# Patient Record
Sex: MALE | Race: WHITE | NOT HISPANIC OR LATINO | Employment: FULL TIME | URBAN - METROPOLITAN AREA
[De-identification: names, ages, dates, MRNs, and addresses within clinical notes are randomized per-mention and may not be internally consistent; named-entity substitution may affect disease eponyms.]

---

## 2017-01-16 ENCOUNTER — OFFICE VISIT (OUTPATIENT)
Dept: NEUROLOGY | Facility: CLINIC | Age: 32
End: 2017-01-16
Payer: COMMERCIAL

## 2017-01-16 VITALS
WEIGHT: 241.19 LBS | SYSTOLIC BLOOD PRESSURE: 122 MMHG | HEIGHT: 71 IN | BODY MASS INDEX: 33.77 KG/M2 | HEART RATE: 91 BPM | DIASTOLIC BLOOD PRESSURE: 72 MMHG

## 2017-01-16 DIAGNOSIS — G40.409 GRAND MAL EPILEPSY, CONTROLLED: Primary | ICD-10-CM

## 2017-01-16 DIAGNOSIS — F41.9 ANXIETY DISORDER, UNSPECIFIED TYPE: ICD-10-CM

## 2017-01-16 PROCEDURE — 99999 PR PBB SHADOW E&M-EST. PATIENT-LVL III: CPT | Mod: PBBFAC,,, | Performed by: PSYCHIATRY & NEUROLOGY

## 2017-01-16 PROCEDURE — 1159F MED LIST DOCD IN RCRD: CPT | Mod: S$GLB,,, | Performed by: PSYCHIATRY & NEUROLOGY

## 2017-01-16 PROCEDURE — 99204 OFFICE O/P NEW MOD 45 MIN: CPT | Mod: S$GLB,,, | Performed by: PSYCHIATRY & NEUROLOGY

## 2017-01-16 RX ORDER — CLONAZEPAM 0.5 MG/1
1 TABLET ORAL
COMMUNITY
Start: 2013-02-08 | End: 2017-01-16 | Stop reason: SDUPTHER

## 2017-01-16 RX ORDER — LEVETIRACETAM 250 MG/1
750 TABLET ORAL
COMMUNITY
Start: 2013-02-02 | End: 2017-04-21

## 2017-01-16 RX ORDER — CLONAZEPAM 0.5 MG/1
0.5 TABLET ORAL 2 TIMES DAILY
Qty: 60 TABLET | Refills: 3 | Status: SHIPPED | OUTPATIENT
Start: 2017-01-16 | End: 2017-04-21 | Stop reason: SDUPTHER

## 2017-01-16 RX ORDER — LEVETIRACETAM 500 MG/1
1000 TABLET, FILM COATED ORAL 2 TIMES DAILY
Qty: 120 TABLET | Refills: 11 | Status: SHIPPED | OUTPATIENT
Start: 2017-01-16 | End: 2017-04-21 | Stop reason: SDUPTHER

## 2017-01-16 RX ORDER — LAMOTRIGINE 100 MG/1
TABLET, ORALLY DISINTEGRATING ORAL
Qty: 150 TABLET | Refills: 6 | Status: SHIPPED | OUTPATIENT
Start: 2017-01-16 | End: 2017-04-21 | Stop reason: SDUPTHER

## 2017-01-16 RX ORDER — LAMOTRIGINE 100 MG/1
TABLET, ORALLY DISINTEGRATING ORAL
COMMUNITY
Start: 2013-02-02 | End: 2017-01-16 | Stop reason: SDUPTHER

## 2017-01-16 NOTE — PROGRESS NOTES
Consult Requested By: No att. providers found  Reason for Consult: Second opinion on epilepsy    SUBJECTIVE:       HPI:   HISTORY OF PRESENT ILLNESS:  This is a 31-year-old right-handed pleasant   gentleman, presented today in the clinic with the complaint of uncontrolled   seizures and second opinion on his epilepsy occurrence.  He said that he had   viral encephalitis at age 1 and started having seizure at age 10.  At age 12, he   started having grand mal seizure and at that time [place] [facility] [place], he has been evaluated intracranially in case he can have epileptic   surgery, but it has not been done because of potential loss of vision.  So,   since then, he was on medications for last 3 to 7 years.  He is on Keppra and   Lamictal in different tapering dose.  He was with Dr. Gale, [facility], now is planning to come to [facility].  He said that lately he has some   breakthrough spells, which he describes sudden onset in attention and loss of   focus during conversation or activity.  It is not often, sometimes it is daily,   sometimes in a week, sometimes once in a month.  He is  to his wife for   about five years.  Wife also mentioned that there is no grand mal seizure in   about 4 to 5 years, but he has this spell when he lost connections, but it is   for a couple of seconds to minute; after that he is totally normal.      Past Medical History   Diagnosis Date    Seizures      History reviewed. No pertinent past surgical history.  History reviewed. No pertinent family history.  Social History   Substance Use Topics    Smoking status: Current Every Day Smoker    Smokeless tobacco: None    Alcohol use Yes     Review of patient's allergies indicates:  No Known Allergies    Review of Systems   Constitutional: Negative for fever and weight loss.   HENT: Negative for ear pain, hearing loss and tinnitus.    Eyes: Negative for blurred vision, double vision, photophobia, pain,  discharge and redness.   Respiratory: Negative for cough and shortness of breath.    Cardiovascular: Negative for chest pain, palpitations, claudication and leg swelling.   Gastrointestinal: Negative for abdominal pain, heartburn, nausea and vomiting.   Genitourinary: Negative for dysuria, flank pain, frequency and urgency.   Musculoskeletal: Negative for back pain, falls, joint pain, myalgias and neck pain.   Skin: Negative for itching and rash.   Neurological: Positive for seizures. Negative for dizziness, tingling, tremors, sensory change, speech change, focal weakness, loss of consciousness, weakness and headaches.   Endo/Heme/Allergies: Does not bruise/bleed easily.   Psychiatric/Behavioral: Negative for depression, hallucinations, memory loss and suicidal ideas. The patient is nervous/anxious. The patient does not have insomnia.          OBJECTIVE:     Vital Signs (Most Recent)  Pulse: 91 (01/16/17 0809)  BP: 122/72 (01/16/17 0809)    Physical Exam   Constitutional: He is oriented to person, place, and time. He appears well-developed and well-nourished. No distress.   HENT:   Head: Normocephalic.   Right Ear: External ear normal.   Left Ear: External ear normal.   Mouth/Throat: Oropharynx is clear and moist.   Eyes: Conjunctivae and EOM are normal. Pupils are equal, round, and reactive to light.   Neck: Normal range of motion. Neck supple. No tracheal deviation present. No thyromegaly present.   Cardiovascular: Regular rhythm, normal heart sounds and intact distal pulses.    Pulmonary/Chest: Effort normal and breath sounds normal. No respiratory distress.   Abdominal: Soft. Bowel sounds are normal. There is no tenderness.   Musculoskeletal: He exhibits no edema or tenderness.   Lymphadenopathy:     He has no cervical adenopathy.   Neurological: He is alert and oriented to person, place, and time. He has normal strength and normal reflexes. He displays no tremor and normal reflexes. No cranial nerve deficit or  sensory deficit. He exhibits normal muscle tone. Coordination normal. He displays no Babinski's sign on the right side. He displays no Babinski's sign on the left side.   Reflex Scores:       Tricep reflexes are 2+ on the right side and 2+ on the left side.       Bicep reflexes are 2+ on the right side and 2+ on the left side.       Brachioradialis reflexes are 2+ on the right side and 2+ on the left side.       Patellar reflexes are 2+ on the right side and 2+ on the left side.       Achilles reflexes are 2+ on the right side and 2+ on the left side.  Skin: Skin is warm and dry. No rash noted. He is not diaphoretic. No erythema. No pallor.   Psychiatric: He has a normal mood and affect. His behavior is normal. Judgment and thought content normal.       Strength  Deltoids Triceps Biceps Wrist Extension Wrist Flexion Hand    Upper: R 5/5 5/5 5/5 5/5 5/5 5/5    L 5/5 5/5 5/5 5/5 5/5 5/5     Iliopsoas Quadriceps Knee  Flexion Tibialis  anterior Gastro- cnemius EHL   Lower: R 5/5 5/5 5/5 5/5 5/5 5/5    L 5/5 5/5 5/5 5/5 5/5 5/5         ASSESSMENT/PLAN:     Primary Diagnoses:  Diagnosed epilepsy for long time but having breakthrough mini spell  Despite medication.     Plan:  Will continue keppra 1000 mg bid  Lamectal 300 + 200 daily.  Klonopin 0.5 mg bid.  Talked about cutting down Klonopin in future.  Will see the records of spells in a month.  Reevaluate with MRI of the brain, EMU  And lab works.  Driving issue and state law was discussed.  Will see next month.

## 2017-01-16 NOTE — MR AVS SNAPSHOT
[facility]  [address]  [place] [address]  Phone: [phone]  Fax: [phone]                  Steven Ayala Jr.   2017 8:00 AM   Office Visit    Description:  Male : 1985   Provider:  Darrian Copeland MD   Department:  [facility]           Reason for Visit     epilepsy                To Do List           Future Appointments        Provider Department Dept Phone    2017 11:20 AM Darrian Copeland MD [facility] [phone]      Goals (5 Years of Data)     None       These Medications        Disp Refills Start End    KEPPRA 500 mg Tab 120 tablet 11 2017    Take 2 tablets (1,000 mg total) by mouth 2 (two) times daily. - Oral    clonazePAM (KLONOPIN) 0.5 MG tablet 60 tablet 3 2017     Take 1 tablet (0.5 mg total) by mouth 2 (two) times daily. - Oral    lamotrigine (LAMICTAL ODT) 100 mg TbDL 150 tablet 6 2017     300mg in the am and 200mg at night      [facility] On Call     [facility] On Call Nurse Care Line -  Assistance  Registered nurses in the [facility] provide clinical advisement, health education, appointment booking, and other advisory services.  Call for this free service at [phone].             Medications           Message regarding Medications     Verify the changes and/or additions to your medication regime listed below are the same as discussed with your clinician today.  If any of these changes or additions are incorrect, please notify your healthcare provider.        START taking these NEW medications        Refills    KEPPRA 500 mg Tab 11    Sig: Take 2 tablets (1,000 mg total) by mouth 2 (two) times daily.    Class: Print    Route: Oral    clonazePAM (KLONOPIN) 0.5 MG tablet 3    Sig: Take 1 tablet (0.5 mg total) by mouth 2 (two) times daily.    Class: Print    Route: Oral    lamotrigine (LAMICTAL ODT) 100 mg TbDL 6    Simg in the am and 200mg at night    Class: Print          "  Verify that the below list of medications is an accurate representation of the medications you are currently taking.  If none reported, the list may be blank. If incorrect, please contact your healthcare provider. Carry this list with you in case of emergency.           Current Medications     clonazePAM (KLONOPIN) 0.5 MG tablet Take 1 tablet (0.5 mg total) by mouth 2 (two) times daily.    lamotrigine (LAMICTAL ODT) 100 mg TbDL 300mg in the am and 200mg at night    levetiracetam (KEPPRA) 250 MG Tab 750 mg.    KEPPRA 500 mg Tab Take 2 tablets (1,000 mg total) by mouth 2 (two) times daily.           Clinical Reference Information           Vital Signs - Last Recorded  Most recent update: 1/16/2017  8:09 AM by Dmitry Bernal LPN    BP Pulse Ht Wt BMI    122/72 91 5' 11" (1.803 m) 109.4 kg (241 lb 2.9 oz) 33.64 kg/m2      Blood Pressure          Most Recent Value    BP  122/72      Allergies as of 1/16/2017     No Known Allergies      Immunizations Administered on Date of Encounter - 1/16/2017     None      [facility] Sign-Up     Activating your [facility] account is as easy as 1-2-3!     1) Visit [email or web address], select Sign Up Now, enter this activation code and your date of birth, then select Next.  [email or web address]  Expires: 1/20/2017  8:21 AM      2) Create a username and password to use when you visit [facility] in the future and select a security question in case you lose your password and select Next.    3) Enter your e-mail address and click Sign Up!    Additional Information  If you have questions, please e-mail [email or web address] or call [phone] to talk to our [facility] staff. Remember, [facility] is NOT to be used for urgent needs. For medical emergencies, dial 911.         Smoking Cessation     If you would like to quit smoking:   You may be eligible for free services if you are a [place] resident and started smoking cigarettes before September 1, 1988.  Call the Smoking Cessation Trust (SCT) " toll free at (848) 358-1273 or [phone] [phone].   Call 5-222-QUIT-NOW if you do not meet the above criteria.

## 2017-04-21 ENCOUNTER — OFFICE VISIT (OUTPATIENT)
Dept: NEUROLOGY | Facility: CLINIC | Age: 32
End: 2017-04-21
Payer: COMMERCIAL

## 2017-04-21 VITALS
DIASTOLIC BLOOD PRESSURE: 72 MMHG | SYSTOLIC BLOOD PRESSURE: 130 MMHG | BODY MASS INDEX: 33.86 KG/M2 | HEART RATE: 96 BPM | HEIGHT: 71 IN | WEIGHT: 241.88 LBS

## 2017-04-21 DIAGNOSIS — G40.909 RECURRENT SEIZURES: Primary | ICD-10-CM

## 2017-04-21 DIAGNOSIS — G40.409 GRAND MAL EPILEPSY, CONTROLLED: ICD-10-CM

## 2017-04-21 PROCEDURE — 99215 OFFICE O/P EST HI 40 MIN: CPT | Mod: S$GLB,,, | Performed by: PSYCHIATRY & NEUROLOGY

## 2017-04-21 PROCEDURE — 99999 PR PBB SHADOW E&M-EST. PATIENT-LVL III: CPT | Mod: PBBFAC,,, | Performed by: PSYCHIATRY & NEUROLOGY

## 2017-04-21 PROCEDURE — 1160F RVW MEDS BY RX/DR IN RCRD: CPT | Mod: S$GLB,,, | Performed by: PSYCHIATRY & NEUROLOGY

## 2017-04-21 RX ORDER — LAMOTRIGINE 25 MG/1
25 TABLET ORAL DAILY
Qty: 30 TABLET | Refills: 11 | Status: SHIPPED | OUTPATIENT
Start: 2017-04-21 | End: 2017-04-21

## 2017-04-21 RX ORDER — LAMOTRIGINE 100 MG/1
TABLET, ORALLY DISINTEGRATING ORAL
Qty: 150 TABLET | Refills: 6 | Status: SHIPPED | OUTPATIENT
Start: 2017-04-21 | End: 2017-04-21

## 2017-04-21 RX ORDER — LEVETIRACETAM 500 MG/1
1000 TABLET, FILM COATED ORAL 2 TIMES DAILY
Qty: 120 TABLET | Refills: 11 | Status: SHIPPED | OUTPATIENT
Start: 2017-04-21 | End: 2018-04-21

## 2017-04-21 RX ORDER — LAMOTRIGINE 100 MG/1
TABLET ORAL
Qty: 150 TABLET | Refills: 11 | Status: SHIPPED | OUTPATIENT
Start: 2017-04-21 | End: 2018-04-30 | Stop reason: SDUPTHER

## 2017-04-21 RX ORDER — CLONAZEPAM 0.5 MG/1
0.5 TABLET ORAL 2 TIMES DAILY
Qty: 60 TABLET | Refills: 3 | Status: SHIPPED | OUTPATIENT
Start: 2017-04-21 | End: 2018-04-04 | Stop reason: SDUPTHER

## 2017-04-21 NOTE — LETTER
2017    Steven Ayala Jr.  [address] [address]         '[facility]  [address] [place] [address] Drive  [facility] [address]  Phone: [phone]  Fax: [phone] 2017     Patient: Steven Ayala Jr.   YOB: 1985   Date of Visit: 2017       To Whom It May Concern:    It is my medical opinion that Steven Ayala may return to work on 2017 with restriction below.        SEIZURE PRECAUTION      [facility] Department of Neurology has discussed and alerted me to the danger of driving, after being diagnosed with a Seizure or possible Seizures Disorder.    The situation of driving and Seizure Disorder is very dangerous for me as the patient, as well as others on the road.  It was explained to me that seizure medication does not guarantee the full control of seizure episodes.  Seizures can occur at any time and anywhere and in any situation.    My Neurologist discussed with me Seizure Safety Precautions, and I was informed that patients with Seizure Disorders should avoid the followin. Unattended Swimming.  2. Working in the fireplace.  3. Climbing high ladders, steps, and trees.  4. Working with sharp cutting machines and tools, or any other potentially harmful activity.    I understand that the State [facility] does not require the doctor to report Seizure Disorders to the Department of Motor Vehicles.  However, I am aware that me, as a patient with a Seizure Disorder, am personally obligated to inform the doctor and the Motor Vehicle Department if a situation arises.      Signature of the Patient:_________________________    Signature of the Doctor:       Signature of the witness :          If you have any questions or concerns, please don't hesitate to call.    Sincerely,        Darrian Copeland MD

## 2017-04-21 NOTE — MR AVS SNAPSHOT
[facility]  [address]  [place] [address]  Phone: [phone]  Fax: [phone]                  Steven Ayala Jr.   2017 9:20 AM   Office Visit    Description:  Male : 1985   Provider:  Darrian Copeland MD   Department:  [facility]           Reason for Visit     Seizures     needs release to work           Diagnoses this Visit        Comments    Recurrent seizures    -  Primary            To Do List           Future Appointments        Provider Department Dept Phone    2017 11:50 AM LABORATORY, [address] [facility] [phone]    2017 8:45 AM [facility] MRI [facility] [phone]    2017 11:20 AM Darrian Copeland MD [facility] [phone]      Goals (5 Years of Data)     None       These Medications        Disp Refills Start End    KEPPRA 500 mg Tab 120 tablet 11 2017    Take 2 tablets (1,000 mg total) by mouth 2 (two) times daily. - Oral    Pharmacy: [place] [address] Ph #: [phone]       lamotrigine (LAMICTAL ODT) 100 mg TbDL 150 tablet 6 2017     300mg in the am and 200mg at night    Pharmacy: [place] [address] Ph #: [phone]       clonazePAM (KLONOPIN) 0.5 MG tablet 60 tablet 3 2017     Take 1 tablet (0.5 mg total) by mouth 2 (two) times daily. - Oral    Pharmacy: [facility] [address] Ph #: [phone]       LAMICTAL 100 mg tablet 150 tablet 11 2017     300 mg in am and 200 in pm    Pharmacy: [place] [address] Ph #: [phone]         [facility]     [facility] Nurse Care Line -  Assistance  Unless otherwise directed by your provider, please contact [facility], our nurse care line that is available for  assistance.     Registered nurses in the [facility] provide: appointment scheduling,  "clinical advisement, health education, and other advisory services.  Call: [phone] (toll free)               Medications           Message regarding Medications     Verify the changes and/or additions to your medication regime listed below are the same as discussed with your clinician today.  If any of these changes or additions are incorrect, please notify your healthcare provider.        START taking these NEW medications        Refills    LAMICTAL 100 mg tablet 11    Si mg in am and 200 in pm    Class: Normal           Verify that the below list of medications is an accurate representation of the medications you are currently taking.  If none reported, the list may be blank. If incorrect, please contact your healthcare provider. Carry this list with you in case of emergency.           Current Medications     clonazePAM (KLONOPIN) 0.5 MG tablet Take 1 tablet (0.5 mg total) by mouth 2 (two) times daily.    KEPPRA 500 mg Tab Take 2 tablets (1,000 mg total) by mouth 2 (two) times daily.    lamotrigine (LAMICTAL ODT) 100 mg TbDL 300mg in the am and 200mg at night    LAMICTAL 100 mg tablet 300 mg in am and 200 in pm           Clinical Reference Information           Your Vitals Were     BP Pulse Height Weight BMI    130/72 96 5' 11" (1.803 m) 109.7 kg (241 lb 13.5 oz) 33.73 kg/m2      Blood Pressure          Most Recent Value    BP  130/72      Allergies as of 2017     No Known Allergies      Immunizations Administered on Date of Encounter - 2017     None      Orders Placed During Today's Visit     Future Labs/Procedures Expected by Expires    Levetiracetam level  2017    MRI Brain W WO Contrast  2017      [facility] Sign-Up     Activating your [facility] account is as easy as 1-2-3!     1) Visit [email or web address], select Sign Up Now, enter this activation code and your date of birth, then select Next.  [email or web address]  Expires: 2017 10:26 AM      2) Create a " username and password to use when you visit [facility] in the future and select a security question in case you lose your password and select Next.    3) Enter your e-mail address and click Sign Up!    Additional Information  If you have questions, please e-mail [email or web address] or call [phone] to talk to our [facility] staff. Remember, [facility] is NOT to be used for urgent needs. For medical emergencies, dial 911.         Smoking Cessation     If you would like to quit smoking:   You may be eligible for free services if you are a [place] resident and started smoking cigarettes before September 1, 1988.  Call the Smoking Cessation Trust ([facility]) toll free at (022) 717-1915 or (726) 383-9698.   Call 9-834-QUIT-NOW if you do not meet the above criteria.   Contact us via email: [email or web address]   View our website for more information: [email or web address]        Language Assistance Services     ATTENTION: Language assistance services are available, free of charge. Please call [phone].      ATENCIÓN: Si habla español, tiene a huston disposición servicios gratuitos de asistencia lingüística. Llame al [phone].     CHÚ Ý: N?u b?n nói Ti?ng Vi?t, có các d?ch v? h? tr? ngôn ng? mi?n phí dành cho b?n. G?i s? [phone].         [facility] complies with applicable Federal civil rights laws and does not discriminate on the basis of race, color, national origin, age, disability, or sex.

## 2017-04-28 ENCOUNTER — LAB VISIT (OUTPATIENT)
Dept: LAB | Facility: HOSPITAL | Age: 32
End: 2017-04-28
Attending: INTERNAL MEDICINE
Payer: COMMERCIAL

## 2017-04-28 DIAGNOSIS — G40.909 RECURRENT SEIZURES: ICD-10-CM

## 2017-04-28 PROCEDURE — 80177 DRUG SCRN QUAN LEVETIRACETAM: CPT

## 2017-04-28 PROCEDURE — 36415 COLL VENOUS BLD VENIPUNCTURE: CPT

## 2017-05-01 LAB — LEVETIRACETAM SERPL-MCNC: 5.7 UG/ML (ref 3–60)

## 2017-05-04 ENCOUNTER — TELEPHONE (OUTPATIENT)
Dept: NEUROLOGY | Facility: CLINIC | Age: 32
End: 2017-05-04

## 2017-05-04 ENCOUNTER — TELEPHONE (OUTPATIENT)
Dept: RADIOLOGY | Facility: HOSPITAL | Age: 32
End: 2017-05-04

## 2017-05-04 NOTE — TELEPHONE ENCOUNTER
----- Message from Darrian Copeland MD sent at 5/1/2017  8:02 AM CDT -----  Result is normal

## 2017-05-19 ENCOUNTER — OFFICE VISIT (OUTPATIENT)
Dept: NEUROLOGY | Facility: CLINIC | Age: 32
End: 2017-05-19
Payer: COMMERCIAL

## 2017-05-19 VITALS
SYSTOLIC BLOOD PRESSURE: 132 MMHG | HEART RATE: 84 BPM | WEIGHT: 242.5 LBS | HEIGHT: 71 IN | DIASTOLIC BLOOD PRESSURE: 82 MMHG | BODY MASS INDEX: 33.95 KG/M2

## 2017-05-19 DIAGNOSIS — G40.409 GRAND MAL EPILEPSY, CONTROLLED: Primary | ICD-10-CM

## 2017-05-19 PROCEDURE — 99215 OFFICE O/P EST HI 40 MIN: CPT | Mod: S$GLB,,, | Performed by: PSYCHIATRY & NEUROLOGY

## 2017-05-19 PROCEDURE — 1160F RVW MEDS BY RX/DR IN RCRD: CPT | Mod: S$GLB,,, | Performed by: PSYCHIATRY & NEUROLOGY

## 2017-05-19 PROCEDURE — 99999 PR PBB SHADOW E&M-EST. PATIENT-LVL II: CPT | Mod: PBBFAC,,, | Performed by: PSYCHIATRY & NEUROLOGY

## 2017-05-19 NOTE — PROGRESS NOTES
Progress note  Neurology      Neurology follow up:  For: Epilepsy.    SUBJECTIVE:      HPI:   No seizure . He is here for paper works.    Past Medical History:   Diagnosis Date    Seizures      History reviewed. No pertinent surgical history.  History reviewed. No pertinent family history.  Social History   Substance Use Topics    Smoking status: Current Every Day Smoker    Smokeless tobacco: None    Alcohol use Yes       Review of patient's allergies indicates:  No Known Allergies   There is no problem list on file for this patient.        Review of Systems   Constitutional: Negative for fever and weight loss.   HENT: Negative for ear pain, hearing loss and tinnitus.    Eyes: Negative for blurred vision, double vision, photophobia, pain, discharge and redness.   Respiratory: Negative for cough and shortness of breath.    Cardiovascular: Negative for chest pain, palpitations, claudication and leg swelling.   Gastrointestinal: Negative for abdominal pain, heartburn, nausea and vomiting.   Genitourinary: Negative for dysuria, flank pain, frequency and urgency.   Musculoskeletal: Negative for back pain, falls, joint pain, myalgias and neck pain.   Skin: Negative for itching and rash.   Neurological: Positive for seizures. Negative for dizziness, tingling, tremors, sensory change, speech change, focal weakness, loss of consciousness, weakness and headaches.   Endo/Heme/Allergies: Does not bruise/bleed easily.   Psychiatric/Behavioral: Negative for depression, hallucinations, memory loss and suicidal ideas. The patient is nervous/anxious. The patient does not have insomnia.            OBJECTIVE:     Physical Exam   Constitutional: He is oriented to person, place, and time. He appears well-developed and well-nourished. No distress.   HENT:   Head: Normocephalic.   Right Ear: External ear normal.   Left Ear: External ear normal.   Mouth/Throat: Oropharynx is clear and moist.   Eyes: Conjunctivae and EOM are normal.  Pupils are equal, round, and reactive to light.   Neck: Normal range of motion. Neck supple. No tracheal deviation present. No thyromegaly present.   Cardiovascular: Regular rhythm, normal heart sounds and intact distal pulses.    Pulmonary/Chest: Effort normal and breath sounds normal. No respiratory distress.   Abdominal: Soft. Bowel sounds are normal. There is no tenderness.   Musculoskeletal: He exhibits no edema or tenderness.   Lymphadenopathy:     He has no cervical adenopathy.   Neurological: He is alert and oriented to person, place, and time. He has normal strength and normal reflexes. He displays no tremor and normal reflexes. No cranial nerve deficit or sensory deficit. He exhibits normal muscle tone. Coordination normal. He displays no Babinski's sign on the right side. He displays no Babinski's sign on the left side.   Reflex Scores:       Tricep reflexes are 2+ on the right side and 2+ on the left side.       Bicep reflexes are 2+ on the right side and 2+ on the left side.       Brachioradialis reflexes are 2+ on the right side and 2+ on the left side.       Patellar reflexes are 2+ on the right side and 2+ on the left side.       Achilles reflexes are 2+ on the right side and 2+ on the left side.  Skin: Skin is warm and dry. No rash noted. He is not diaphoretic. No erythema. No pallor.   Psychiatric: He has a normal mood and affect. His behavior is normal. Judgment and thought content normal.       Strength  Deltoids Triceps Biceps Wrist Extension Wrist Flexion Hand    Upper: R 5/5 5/5 5/5 5/5 5/5 5/5    L 5/5 5/5 5/5 5/5 5/5 5/5     Iliopsoas Quadriceps Knee  Flexion Tibialis  anterior Gastro- cnemius EHL   Lower: R 5/5 5/5 5/5 5/5 5/5 5/5    L 5/5 5/5 5/5 5/5 5/5 5/5               ASSESSMENT/PLAN:     Assessment:   1. Epilepsy on medication but still having break through seizure he did some noncompliance.    Plan:  1. Seizure precaution discussed. He signed the paper .  2. Medication refills  are done.  3. Advised to slowly taper off Klonopin     Will see in 3 months.

## 2017-05-19 NOTE — MR AVS SNAPSHOT
"    [facility]  [address]  [place] [address]  Phone: [phone]  Fax: [phone]                  Steven Ayala Jr.   2017 11:20 AM   Office Visit    Description:  Male : 1985   Provider:  Darrian Copeland MD   Department:  [facility]                To Do List           Future Appointments        Provider Department Dept Phone    2017 11:20 AM Darrian Copeland MD [facility] [phone]      Goals (5 Years of Data)     None      Follow-Up and Disposition     Return in about 3 months (around 2017).      [facility]     [facility] Nurse Care Line -  Assistance  Unless otherwise directed by your provider, please contact [facility], our nurse care line that is available for  assistance.     Registered nurses in the [facility] Center provide: appointment scheduling, clinical advisement, health education, and other advisory services.  Call: [phone] (toll free)               Medications           Message regarding Medications     Verify the changes and/or additions to your medication regime listed below are the same as discussed with your clinician today.  If any of these changes or additions are incorrect, please notify your healthcare provider.             Verify that the below list of medications is an accurate representation of the medications you are currently taking.  If none reported, the list may be blank. If incorrect, please contact your healthcare provider. Carry this list with you in case of emergency.           Current Medications     clonazePAM (KLONOPIN) 0.5 MG tablet Take 1 tablet (0.5 mg total) by mouth 2 (two) times daily.    KEPPRA 500 mg Tab Take 2 tablets (1,000 mg total) by mouth 2 (two) times daily.    LAMICTAL 100 mg tablet 300 mg in am and 200 in pm           Clinical Reference Information           Your Vitals Were     BP Pulse Height Weight BMI    132/82 84 5' 11" (1.803 m) 110 kg (242 lb " 8.1 oz) 33.82 kg/m2      Blood Pressure          Most Recent Value    BP  132/82      Allergies as of 5/19/2017     No Known Allergies      Immunizations Administered on Date of Encounter - 5/19/2017     None      [facility] Sign-Up     Activating your [facility] account is as easy as 1-2-3!     1) Visit [email or web address], select Sign Up Now, enter this activation code and your date of birth, then select Next.  [email or web address]  Expires: 6/5/2017 10:26 AM      2) Create a username and password to use when you visit [facility] in the future and select a security question in case you lose your password and select Next.    3) Enter your e-mail address and click Sign Up!    Additional Information  If you have questions, please e-mail [email or web address] or call [phone] to talk to our [facility] staff. Remember, [facility] is NOT to be used for urgent needs. For medical emergencies, dial 911.         Smoking Cessation     If you would like to quit smoking:   You may be eligible for free services if you are a [place] resident and started smoking cigarettes before September 1, 1988.  Call the Smoking Cessation Trust ([facility]) toll free at (235) 841-2835 or (667) 233-2778.   Call 1-800-QUIT-NOW if you do not meet the above criteria.   Contact us via email: [email or web address]   View our website for more information: [email or web address]        Language Assistance Services     ATTENTION: Language assistance services are available, free of charge. Please call [phone].      ATENCIÓN: Si habla español, tiene a huston disposición servicios gratuitos de asistencia lingüística. Llame al [phone].     CHÚ Ý: N?u b?n nói Ti?ng Vi?t, có các d?ch v? h? tr? ngôn ng? mi?n phí dành cho b?n. G?i s? [phone].         [facility] complies with applicable Federal civil rights laws and does not discriminate on the basis of race, color, national origin, age, disability, or sex.

## 2018-02-28 ENCOUNTER — TELEPHONE (OUTPATIENT)
Dept: NEUROLOGY | Facility: CLINIC | Age: 33
End: 2018-02-28

## 2018-02-28 NOTE — TELEPHONE ENCOUNTER
Called to inform pt that there wasn't any opens , but informed her that I could add him to the waiting list if anything came up pt verbalized understanding .Amy Nieves

## 2018-03-12 ENCOUNTER — OFFICE VISIT (OUTPATIENT)
Dept: NEUROLOGY | Facility: CLINIC | Age: 33
End: 2018-03-12
Payer: COMMERCIAL

## 2018-03-12 VITALS
BODY MASS INDEX: 31.26 KG/M2 | WEIGHT: 223.31 LBS | HEART RATE: 86 BPM | HEIGHT: 71 IN | DIASTOLIC BLOOD PRESSURE: 82 MMHG | SYSTOLIC BLOOD PRESSURE: 128 MMHG

## 2018-03-12 DIAGNOSIS — R56.9 SEIZURE: ICD-10-CM

## 2018-03-12 PROCEDURE — 99215 OFFICE O/P EST HI 40 MIN: CPT | Mod: S$GLB,,, | Performed by: PSYCHIATRY & NEUROLOGY

## 2018-03-12 PROCEDURE — 99999 PR PBB SHADOW E&M-EST. PATIENT-LVL III: CPT | Mod: PBBFAC,,, | Performed by: PSYCHIATRY & NEUROLOGY

## 2018-03-12 RX ORDER — LEVETIRACETAM 1000 MG/1
750 TABLET ORAL
COMMUNITY
Start: 2013-02-02 | End: 2018-04-30 | Stop reason: SDUPTHER

## 2018-03-12 NOTE — PROGRESS NOTES
Progress note  Neurology      Neurology follow up:  For: Epilepsy.    SUBJECTIVE:      HPI:   No seizure . He is here for paper works. He is telling that when he was very young , he was told that he has scar tissue in the brain. He did not have MRI of the brain done ordered last time. His last seizure was on 4/19/2017.    Past Medical History:   Diagnosis Date    Seizures      History reviewed. No pertinent surgical history.  History reviewed. No pertinent family history.  Social History   Substance Use Topics    Smoking status: Current Every Day Smoker    Smokeless tobacco: None    Alcohol use Yes       Review of patient's allergies indicates:  No Known Allergies   There is no problem list on file for this patient.        Review of Systems   Constitutional: Negative for fever and weight loss.   HENT: Negative for ear pain, hearing loss and tinnitus.    Eyes: Negative for blurred vision, double vision, photophobia, pain, discharge and redness.   Respiratory: Negative for cough and shortness of breath.    Cardiovascular: Negative for chest pain, palpitations, claudication and leg swelling.   Gastrointestinal: Negative for abdominal pain, heartburn, nausea and vomiting.   Genitourinary: Negative for dysuria, flank pain, frequency and urgency.   Musculoskeletal: Negative for back pain, falls, joint pain, myalgias and neck pain.   Skin: Negative for itching and rash.   Neurological: Positive for seizures. Negative for dizziness, tingling, tremors, sensory change, speech change, focal weakness, loss of consciousness, weakness and headaches.   Endo/Heme/Allergies: Does not bruise/bleed easily.   Psychiatric/Behavioral: Negative for depression, hallucinations, memory loss and suicidal ideas. The patient is nervous/anxious. The patient does not have insomnia.            OBJECTIVE:     Physical Exam   Constitutional: He is oriented to person, place, and time. He appears well-developed and well-nourished. No distress.    HENT:   Head: Normocephalic.   Right Ear: External ear normal.   Left Ear: External ear normal.   Mouth/Throat: Oropharynx is clear and moist.   Eyes: Conjunctivae and EOM are normal. Pupils are equal, round, and reactive to light.   Neck: Normal range of motion. Neck supple. No tracheal deviation present. No thyromegaly present.   Cardiovascular: Regular rhythm, normal heart sounds and intact distal pulses.    Pulmonary/Chest: Effort normal and breath sounds normal. No respiratory distress.   Abdominal: Soft. Bowel sounds are normal. There is no tenderness.   Musculoskeletal: He exhibits no edema or tenderness.   Lymphadenopathy:     He has no cervical adenopathy.   Neurological: He is alert and oriented to person, place, and time. He has normal strength and normal reflexes. He displays no tremor and normal reflexes. No cranial nerve deficit or sensory deficit. He exhibits normal muscle tone. Coordination normal. He displays no Babinski's sign on the right side. He displays no Babinski's sign on the left side.   Reflex Scores:       Tricep reflexes are 2+ on the right side and 2+ on the left side.       Bicep reflexes are 2+ on the right side and 2+ on the left side.       Brachioradialis reflexes are 2+ on the right side and 2+ on the left side.       Patellar reflexes are 2+ on the right side and 2+ on the left side.       Achilles reflexes are 2+ on the right side and 2+ on the left side.  Skin: Skin is warm and dry. No rash noted. He is not diaphoretic. No erythema. No pallor.   Psychiatric: He has a normal mood and affect. His behavior is normal. Judgment and thought content normal.       Strength  Deltoids Triceps Biceps Wrist Extension Wrist Flexion Hand    Upper: R 5/5 5/5 5/5 5/5 5/5 5/5    L 5/5 5/5 5/5 5/5 5/5 5/5     Iliopsoas Quadriceps Knee  Flexion Tibialis  anterior Gastro- cnemius EHL   Lower: R 5/5 5/5 5/5 5/5 5/5 5/5    L 5/5 5/5 5/5 5/5 5/5 5/5               ASSESSMENT/PLAN:      Assessment:   1. Epilepsy on medication but still having break through seizure he did some noncompliance.    Plan:  1. Seizure precaution discussed. He signed the paper .  2. Medication refills are done.  3. Advised to slowly taper off Klonopin   4. MRI of the brain ordered.    Will see in 3 months.

## 2018-03-19 ENCOUNTER — HOSPITAL ENCOUNTER (OUTPATIENT)
Dept: RADIOLOGY | Facility: HOSPITAL | Age: 33
Discharge: HOME | End: 2018-03-19
Attending: PSYCHIATRY & NEUROLOGY
Payer: COMMERCIAL

## 2018-03-19 DIAGNOSIS — R56.9 SEIZURE: ICD-10-CM

## 2018-03-19 PROCEDURE — 70553 MRI BRAIN STEM W/O & W/DYE: CPT | Mod: TC

## 2018-03-19 PROCEDURE — 25500020 PHARM REV CODE 255: Performed by: PSYCHIATRY & NEUROLOGY

## 2018-03-19 PROCEDURE — A9585 GADOBUTROL INJECTION: HCPCS | Performed by: PSYCHIATRY & NEUROLOGY

## 2018-03-19 RX ORDER — GADOBUTROL 604.72 MG/ML
10 INJECTION INTRAVENOUS
Status: COMPLETED | OUTPATIENT
Start: 2018-03-19 | End: 2018-03-19

## 2018-03-19 RX ADMIN — GADOBUTROL 10 ML: 604.72 INJECTION INTRAVENOUS at 05:03

## 2018-03-21 ENCOUNTER — TELEPHONE (OUTPATIENT)
Dept: NEUROLOGY | Facility: CLINIC | Age: 33
End: 2018-03-21

## 2018-03-21 NOTE — TELEPHONE ENCOUNTER
----- Message from Darrian Copeland MD sent at 3/20/2018  8:08 AM CDT -----  Your test result is abnormal but not serious. Will discuss on your return visit.

## 2018-03-21 NOTE — TELEPHONE ENCOUNTER
Called patient, wife states that he was not home. Scheduled patient for follow up to discuss MRI results with Dr. Copeland.

## 2018-04-04 RX ORDER — CLONAZEPAM 0.5 MG/1
0.5 TABLET ORAL 2 TIMES DAILY
Qty: 60 TABLET | Refills: 3 | Status: SHIPPED | OUTPATIENT
Start: 2018-04-04 | End: 2018-09-05 | Stop reason: SDUPTHER

## 2018-04-27 ENCOUNTER — OFFICE VISIT (OUTPATIENT)
Dept: NEUROLOGY | Facility: CLINIC | Age: 33
End: 2018-04-27
Payer: COMMERCIAL

## 2018-04-27 VITALS
DIASTOLIC BLOOD PRESSURE: 80 MMHG | WEIGHT: 220.44 LBS | BODY MASS INDEX: 30.86 KG/M2 | SYSTOLIC BLOOD PRESSURE: 110 MMHG | HEIGHT: 71 IN | RESPIRATION RATE: 20 BRPM | TEMPERATURE: 72 F

## 2018-04-27 DIAGNOSIS — G40.409 GRAND MAL EPILEPSY, CONTROLLED: Primary | ICD-10-CM

## 2018-04-27 PROCEDURE — 99215 OFFICE O/P EST HI 40 MIN: CPT | Mod: S$GLB,,, | Performed by: PSYCHIATRY & NEUROLOGY

## 2018-04-27 PROCEDURE — 99999 PR PBB SHADOW E&M-EST. PATIENT-LVL III: CPT | Mod: PBBFAC,,, | Performed by: PSYCHIATRY & NEUROLOGY

## 2018-04-27 NOTE — PROGRESS NOTES
Progress note  Neurology      Neurology follow up:  For: Epilepsy.    SUBJECTIVE:      HPI:   No seizure . He is here for paper works. He is telling that when he was very young , he was told that he has scar tissue in the brain. He is here to go over the MRI of the brain. He has some seizure like activities frequently despite he takes medication on time.    Past Medical History:   Diagnosis Date    Seizures      History reviewed. No pertinent surgical history.  History reviewed. No pertinent family history.  Social History   Substance Use Topics    Smoking status: Current Every Day Smoker    Smokeless tobacco: None    Alcohol use Yes       Review of patient's allergies indicates:  No Known Allergies   There is no problem list on file for this patient.        Review of Systems   Constitutional: Negative for fever and weight loss.   HENT: Negative for ear pain, hearing loss and tinnitus.    Eyes: Negative for blurred vision, double vision, photophobia, pain, discharge and redness.   Respiratory: Negative for cough and shortness of breath.    Cardiovascular: Negative for chest pain, palpitations, claudication and leg swelling.   Gastrointestinal: Negative for abdominal pain, heartburn, nausea and vomiting.   Genitourinary: Negative for dysuria, flank pain, frequency and urgency.   Musculoskeletal: Negative for back pain, falls, joint pain, myalgias and neck pain.   Skin: Negative for itching and rash.   Neurological: Positive for seizures. Negative for dizziness, tingling, tremors, sensory change, speech change, focal weakness, loss of consciousness, weakness and headaches.   Endo/Heme/Allergies: Does not bruise/bleed easily.   Psychiatric/Behavioral: Negative for depression, hallucinations, memory loss and suicidal ideas. The patient is nervous/anxious. The patient does not have insomnia.            OBJECTIVE:     Physical Exam   Constitutional: He is oriented to person, place, and time. He appears  well-developed and well-nourished. No distress.   HENT:   Head: Normocephalic.   Right Ear: External ear normal.   Left Ear: External ear normal.   Mouth/Throat: Oropharynx is clear and moist.   Eyes: Conjunctivae and EOM are normal. Pupils are equal, round, and reactive to light.   Neck: Normal range of motion. Neck supple. No tracheal deviation present. No thyromegaly present.   Cardiovascular: Regular rhythm, normal heart sounds and intact distal pulses.    Pulmonary/Chest: Effort normal and breath sounds normal. No respiratory distress.   Abdominal: Soft. Bowel sounds are normal. There is no tenderness.   Musculoskeletal: He exhibits no edema or tenderness.   Lymphadenopathy:     He has no cervical adenopathy.   Neurological: He is alert and oriented to person, place, and time. He has normal strength and normal reflexes. He displays no tremor and normal reflexes. No cranial nerve deficit or sensory deficit. He exhibits normal muscle tone. Coordination normal. He displays no Babinski's sign on the right side. He displays no Babinski's sign on the left side.   Reflex Scores:       Tricep reflexes are 2+ on the right side and 2+ on the left side.       Bicep reflexes are 2+ on the right side and 2+ on the left side.       Brachioradialis reflexes are 2+ on the right side and 2+ on the left side.       Patellar reflexes are 2+ on the right side and 2+ on the left side.       Achilles reflexes are 2+ on the right side and 2+ on the left side.  Skin: Skin is warm and dry. No rash noted. He is not diaphoretic. No erythema. No pallor.   Psychiatric: He has a normal mood and affect. His behavior is normal. Judgment and thought content normal.       Strength  Deltoids Triceps Biceps Wrist Extension Wrist Flexion Hand    Upper: R 5/5 5/5 5/5 5/5 5/5 5/5    L 5/5 5/5 5/5 5/5 5/5 5/5     Iliopsoas Quadriceps Knee  Flexion Tibialis  anterior Gastro- cnemius EHL   Lower: R 5/5 5/5 5/5 5/5 5/5 5/5    L 5/5 5/5 5/5 5/5 5/5  5/5     MRI of the brain: 3/19/2018    Impression         No definite acute process.    Several nonspecific 2 mm subcortical white matter hyperintensity signal foci.    Small zone of increased T2 signal right occipital cortex which may represent gliosis or post traumatic encephalomalacia.  Please correlate with clinical history.    Otherwise negative.           ASSESSMENT/PLAN:     Assessment:   1. Epilepsy on medication but still having break through seizure he did some noncompliance.    Plan:  1. Seizure precaution discussed. He signed the paper .  2. Medication refills are done.  3. Advised to slowly taper off Klonopin   4.  Will refer him to [facility] for epilepsy monitoring.      Will see in PRN

## 2018-04-30 ENCOUNTER — TELEPHONE (OUTPATIENT)
Dept: NEUROLOGY | Facility: CLINIC | Age: 33
End: 2018-04-30

## 2018-04-30 RX ORDER — LEVETIRACETAM 1000 MG/1
1000 TABLET ORAL 2 TIMES DAILY
Qty: 60 TABLET | Refills: 11 | Status: ON HOLD | OUTPATIENT
Start: 2018-04-30 | End: 2018-07-15 | Stop reason: HOSPADM

## 2018-04-30 RX ORDER — LAMOTRIGINE 100 MG/1
TABLET ORAL
Qty: 150 TABLET | Refills: 11 | Status: SHIPPED | OUTPATIENT
Start: 2018-04-30 | End: 2018-09-05

## 2018-04-30 NOTE — TELEPHONE ENCOUNTER
----- Message from Oriana Wallace RN sent at 4/27/2018  9:53 AM CDT -----  Contact: susan white lpn      ----- Message -----  From: Susan White LPN  Sent: 4/27/2018   9:40 AM  To: [facility] Staff    Dr. Darrian Copeland would lik eto refer this pt for seizures. Please let me know if I can help in any way to get him in. Thanks so much.

## 2018-05-16 ENCOUNTER — OFFICE VISIT (OUTPATIENT)
Dept: NEUROLOGY | Facility: CLINIC | Age: 33
End: 2018-05-16
Payer: COMMERCIAL

## 2018-05-16 VITALS
HEART RATE: 68 BPM | BODY MASS INDEX: 31.14 KG/M2 | SYSTOLIC BLOOD PRESSURE: 118 MMHG | HEIGHT: 71 IN | DIASTOLIC BLOOD PRESSURE: 78 MMHG | WEIGHT: 222.44 LBS

## 2018-05-16 DIAGNOSIS — G40.219 PARTIAL EPILEPSY, WITH IMPAIRMENT OF CONSCIOUSNESS, WITH INTRACTABLE EPILEPSY: Primary | ICD-10-CM

## 2018-05-16 PROCEDURE — 99214 OFFICE O/P EST MOD 30 MIN: CPT | Mod: S$GLB,,, | Performed by: PSYCHIATRY & NEUROLOGY

## 2018-05-16 PROCEDURE — 99999 PR PBB SHADOW E&M-EST. PATIENT-LVL II: CPT | Mod: PBBFAC,,, | Performed by: PSYCHIATRY & NEUROLOGY

## 2018-05-16 NOTE — LETTER
May 23, 2018      Darrian Copeland MD  [address] [place] [address]           [facility] Epilepsy  [address], [address]  [facility] [address]  Phone: [phone]  Fax: [phone]          Patient: Steven Ayala Jr.   MR Number: [id number]   YOB: 1985   Date of Visit: 5/16/2018       Dear Dr. Darrian Copeland:    Thank you for referring Steven Ayala to me for evaluation. Attached you will find relevant portions of my assessment and plan of care.    If you have questions, please do not hesitate to call me. I look forward to following Steven Ayala along with you.    Sincerely,    Jesse Beasley MD    Enclosure  CC:  No Recipients    If you would like to receive this communication electronically, please contact [email or web address] or [phone] to request more information on [employer] Link access.    For providers and/or their staff who would like to refer a patient to [facility], please contact us through our one-stop-shop provider referral line, [facility], at [phone].    If you feel you have received this communication in error or would no longer like to receive these types of communications, please e-mail [email or web address]

## 2018-05-16 NOTE — PROGRESS NOTES
Name: Steven yAala Jr.  MRN: [id number]   [facility]: [id number]      Date: 05/16/2018    HISTORY OF PRESENT ILLNESS (HPI)  The patient is a 32 y.o.   The patient was initially referred for consultation by DR Darrian Copeland in .  The patient was accompanied today by family members who provided some of the history.      FROM Dr Copeland last visit:  No seizure . He is here for paper works. He is telling that when he was very young , he was told that he has scar tissue in the brain. He is here to go over the MRI of the brain. He has some seizure like activities frequently despite he takes medication on time.          Past Medical History:   Diagnosis Date    Seizures        History reviewed. No pertinent surgical history.  History reviewed. No pertinent family history.       Social History   Substance Use Topics    Smoking status: Current Every Day Smoker    Smokeless tobacco: None    Alcohol use Yes         Review of patient's allergies indicates:  No Known Allergies   There is no problem list on file for this patient.           Review of Systems   Constitutional: Negative for fever and weight loss.   HENT: Negative for ear pain, hearing loss and tinnitus.    Eyes: Negative for blurred vision, double vision, photophobia, pain, discharge and redness.   Respiratory: Negative for cough and shortness of breath.    Cardiovascular: Negative for chest pain, palpitations, claudication and leg swelling.   Gastrointestinal: Negative for abdominal pain, heartburn, nausea and vomiting.   Genitourinary: Negative for dysuria, flank pain, frequency and urgency.   Musculoskeletal: Negative for back pain, falls, joint pain, myalgias and neck pain.   Skin: Negative for itching and rash.   Neurological: Positive for seizures. Negative for dizziness, tingling, tremors, sensory change, speech change, focal weakness, loss of consciousness, weakness and headaches.   Endo/Heme/Allergies: Does not bruise/bleed easily.   Psychiatric/Behavioral:  Negative for depression, hallucinations, memory loss and suicidal ideas. The patient is nervous/anxious. The patient does not have insomnia.                OBJECTIVE:      Physical Exam   Constitutional: He is oriented to person, place, and time. He appears well-developed and well-nourished. No distress.   HENT:   Head: Normocephalic.   Right Ear: External ear normal.   Left Ear: External ear normal.   Mouth/Throat: Oropharynx is clear and moist.   Eyes: Conjunctivae and EOM are normal. Pupils are equal, round, and reactive to light.   Neck: Normal range of motion. Neck supple. No tracheal deviation present. No thyromegaly present.   Cardiovascular: Regular rhythm, normal heart sounds and intact distal pulses.    Pulmonary/Chest: Effort normal and breath sounds normal. No respiratory distress.   Abdominal: Soft. Bowel sounds are normal. There is no tenderness.   Musculoskeletal: He exhibits no edema or tenderness.   Lymphadenopathy:     He has no cervical adenopathy.   Neurological: He is alert and oriented to person, place, and time. He has normal strength and normal reflexes. He displays no tremor and normal reflexes. No cranial nerve deficit or sensory deficit. He exhibits normal muscle tone. Coordination normal. He displays no Babinski's sign on the right side. He displays no Babinski's sign on the left side.   Reflex Scores:       Tricep reflexes are 2+ on the right side and 2+ on the left side.       Bicep reflexes are 2+ on the right side and 2+ on the left side.       Brachioradialis reflexes are 2+ on the right side and 2+ on the left side.       Patellar reflexes are 2+ on the right side and 2+ on the left side.       Achilles reflexes are 2+ on the right side and 2+ on the left side.  Skin: Skin is warm and dry. No rash noted. He is not diaphoretic. No erythema. No pallor.   Psychiatric: He has a normal mood and affect. His behavior is normal. Judgment and thought content normal.         Strength    Deltoids Triceps Biceps Wrist Extension Wrist Flexion Hand    Upper: R 5/5 5/5 5/5 5/5 5/5 5/5     L 5/5 5/5 5/5 5/5 5/5 5/5       Iliopsoas Quadriceps Knee  Flexion Tibialis  anterior Gastro- cnemius EHL   Lower: R 5/5 5/5 5/5 5/5 5/5 5/5     L 5/5 5/5 5/5 5/5 5/5 5/5      MRI of the brain: 3/19/2018     Impression          No definite acute process.    Several nonspecific 2 mm subcortical white matter hyperintensity signal foci.    Small zone of increased T2 signal right occipital cortex which may represent gliosis or post traumatic encephalomalacia.  Please correlate with clinical history.    Otherwise negative.               ASSESSMENT/PLAN:      Assessment:   1. Epilepsy on medication but still having break through seizure he did some noncompliance.     Plan:  1. Seizure precaution discussed. He signed the paper .  2. Medication refills are done.  3. Advised to slowly taper off Klonopin   4.  Will refer him to [facility] for epilepsy monitoring.   _______________________________________________________________________________________________________________________________________________________________________________________    As above, pt is here today primarily to determine canidacy for EMU and for initial evaluation, 2nd opinion.    Pt has a long h/o epilepsy which stems back to age 10. He had encephalitis as a child at approximately age 1, but seizures did not begin until about 10 years later and were worst from age [age] and then went into partial remission, essentially.  He is now 33 y/o and seizures have returned and become refractory to meds at this point over the past few years. He is having frequent staring spells. Several per week typically. He has had GTC in the past, but not lately.  He was evaluated at [facility] in [place] as a child with intracranial EEG monitoring but it was decided that resective surgery would be too risky to perform due to proximity of seiure focus to eloquent cortex.   He is referred here for further monitoring and evaluation.             Seizure Triggers  Sleep Deprivation - None  Other medications - None  Psych/stress - None  Photic stimulation - None  Hyperventilation - None  Medical Problems - None  Menses - No  Sensory Stimulation (light, sound, etc) - None  Missed dose of meds - None    AED Treatments  Present regimen  LTG 300mg AM / 200mg PM  LEV 1000mg BID  Klonopin 0.5mg BID    Prior treatments  Unclear        Not tried  acetazolamide (Diamox, AZM)  amantadine  carbamazepine (Tegretol, CBZ)  clobezam (Onfi or Frizium, CLB)  ethosuximide (Zarontin, ESM)  eslicarbazine (Aptiom, ESL)  felbamate (felbatol, FBM)  gabapentin (Neurontin, GBP)  lacosamide (Vimpat, LCM)   lamotrigine (Lamictal, LTG)   levetiracetam (Keppra, LEV)  methsuximide (Celontin, MSM)  methyphenytoin (Mesantion, MHT)  oxcarbazepine (Trileptal OXC)  perampanel (Fycompa, FCP)   phenobarbital (Pb)  phenytoin (Dilantin, PHT)  pregabalin (Lyrica, PGB)  primidone (Mysoline, PRM)  retigabine (Potiga, RTG)  rufinamide (Banzel, RUF)  tiagabine (Gabatril,  TGB)  topiramate (Topamax, TPM)  viagabatrin, (Sabril, VGB)  vagal nerve stimulator (VNS)  valproic acid (Depakote, VPA)  zonisamide (Zonegran, ZNS)  Benzodiazepines  diazepam - rectal (Diastatl)  diazepam - oral (Valium, DZ)  clonazepam (Klonopin, CZP)  clorazepate (Tranxene, CLZ)  Ativan  Brain Stimulation  Vagal Nerve Stimulation-n/a  DBS- n/a    Compliance method  Memory - yes  Mom or Spouse - Yes  Pill Box - no  Pankaj calendar - no  Turn over medication bottle - no  Phone alarm - no    Seizure Evaluation  EEG Routine -   EEG Ambulatory -   EEG\Video Monitoring -   MRI/MRA -   CT/CTA Scan -   PET Scan -   Neuropsychological evaluation -   DEXA Scan    Potential Epilepsy Risk Factors:   Pregnancy/Labor/Delivery - full term uncomplicated pregnancy labor and vaginal delivery  Febrile seizures - none  Head injury  - none  CNS infection - none     Stroke -  none  Family Hx of Sz - none    PAST MEDICAL HISTORY:   Active Ambulatory Problems     Diagnosis Date Noted    No Active Ambulatory Problems     Resolved Ambulatory Problems     Diagnosis Date Noted    No Resolved Ambulatory Problems     Past Medical History:   Diagnosis Date    Seizures         PAST SURGICAL HISTORY: No past surgical history on file.     FAMILY HISTORY: No family history on file.      SOCIAL HISTORY:   Social History     Social History    Marital status:      Spouse name: N/A    Number of children: N/A    Years of education: N/A     Occupational History    Not on file.     Social History Main Topics    Smoking status: Current Every Day Smoker    Smokeless tobacco: Not on file    Alcohol use Yes    Drug use: Unknown    Sexual activity: Not on file     Other Topics Concern    Not on file     Social History Narrative    No narrative on file        SUBSTANCE USE:  Social History     Social History Main Topics    Smoking status: Current Every Day Smoker    Smokeless tobacco: Not on file    Alcohol use Yes    Drug use: Unknown    Sexual activity: Not on file      Social History   Substance Use Topics    Smoking status: Current Every Day Smoker    Smokeless tobacco: Not on file    Alcohol use Yes        ALLERGIES: Patient has no known allergies.       Review of Systems   Constitutional: Negative for chills, diaphoresis, fever, malaise/fatigue and weight loss.   HENT: Negative for ear pain, hearing loss, nosebleeds and tinnitus.    Eyes: Negative for blurred vision, double vision, photophobia and pain.   Respiratory: Negative for cough, hemoptysis and shortness of breath.    Cardiovascular: Negative for chest pain, palpitations, orthopnea and leg swelling.   Gastrointestinal: Negative for abdominal pain, blood in stool, constipation, diarrhea, heartburn, nausea and vomiting.   Genitourinary: Negative for dysuria and hematuria.   Musculoskeletal: Negative for falls, joint pain  and myalgias.   Skin: Negative for itching and rash.   Neurological: Negative for dizziness, tingling, tremors, sensory change, speech change, focal weakness, loss of consciousness, weakness and headaches.   Endo/Heme/Allergies: Negative for environmental allergies. Does not bruise/bleed easily.   Psychiatric/Behavioral: Negative for depression, hallucinations, memory loss and substance abuse. The patient is not nervous/anxious and does not have insomnia.          PHYSICAL EXAM:    There were no vitals taken for this visit.      Neurologic Exam      Higher Cortical Function:    Patient is a well developed, pleasant, well groomed individual appearing their stated age  Oriented - intact to person, place and time    Fund of knowledge was appropriate.    Language - Speech was fluent without evidence for an aphasia.  Cranial Nerves II - XII:    EOMs were intact with normal smooth and no nystagmus.    PERRLA.   Motor - facial movement was symmetrical and normal.    Facial sensory - Light touch and pin prick sensations were normal.    Hearing was normal.  Palate moved well and was symmetrical    Tongue movement was full & the patient could speak without difficulty.  Motor: Power, bulk and tone were normal in all extremities.  Coordination:  Normal  Gait:  Normal  Tremor: resting, postural, intentional - none  Cardiovascular:  No edema  Skin: No rash         IMPRESSION  1. Long history of seizures. Onset age 10. Likely location related with variable 2nd gen in past.  2. Focal seizures are intractable at this point despite meds.  3. H/O encephalitis as a child.  4. Prior intracranial monitoring as a child of 12-15 y/o in [place]. Then deemed not a good surgical candidate due to risk to eloquent cortex per family.      DISPOSITION:   Admit to EMU for vEEG monitoring and attempt to capture and definitively diagnose spells to determine best therapy  2. Obtain records from [place] if possible  3. Maintain current treatment for  now--adjust after vEEG complete  4. Seizure precautions.  5. State driving laws explained to patient.

## 2018-05-23 ENCOUNTER — TELEPHONE (OUTPATIENT)
Dept: NEUROLOGY | Facility: CLINIC | Age: 33
End: 2018-05-23

## 2018-05-23 NOTE — TELEPHONE ENCOUNTER
I received the signed ROL and faxed request for records to  [facility].

## 2018-06-12 DIAGNOSIS — R56.9 SEIZURES: Primary | ICD-10-CM

## 2018-07-02 ENCOUNTER — TELEPHONE (OUTPATIENT)
Dept: NEUROLOGY | Facility: CLINIC | Age: 33
End: 2018-07-02

## 2018-07-02 NOTE — TELEPHONE ENCOUNTER
Pt's wife called to make sure emu admit was still planned as scheduled. Per Jannet Marin, we do not have authorization as of yet and we will contact them with any problems.

## 2018-07-11 ENCOUNTER — TELEPHONE (OUTPATIENT)
Dept: NEUROLOGY | Facility: CLINIC | Age: 33
End: 2018-07-11

## 2018-07-12 ENCOUNTER — HOSPITAL ENCOUNTER (INPATIENT)
Facility: HOSPITAL | Age: 33
LOS: 3 days | Discharge: HOME | DRG: 101 | End: 2018-07-15
Attending: PSYCHIATRY & NEUROLOGY | Admitting: PSYCHIATRY & NEUROLOGY
Payer: COMMERCIAL

## 2018-07-12 DIAGNOSIS — G40.219 COMPLEX PARTIAL EPILEPSY WITH GENERALIZATION AND WITH INTRACTABLE EPILEPSY: ICD-10-CM

## 2018-07-12 LAB
ALBUMIN SERPL BCP-MCNC: 4.2 G/DL
ALP SERPL-CCNC: 76 U/L
ALT SERPL W/O P-5'-P-CCNC: 20 U/L
ANION GAP SERPL CALC-SCNC: 10 MMOL/L
AST SERPL-CCNC: 17 U/L
BASOPHILS # BLD AUTO: 0.03 K/UL
BASOPHILS NFR BLD: 0.3 %
BILIRUB SERPL-MCNC: 0.2 MG/DL
BUN SERPL-MCNC: 13 MG/DL
CALCIUM SERPL-MCNC: 9.7 MG/DL
CHLORIDE SERPL-SCNC: 104 MMOL/L
CO2 SERPL-SCNC: 24 MMOL/L
CREAT SERPL-MCNC: 0.9 MG/DL
DIFFERENTIAL METHOD: ABNORMAL
EOSINOPHIL # BLD AUTO: 0.2 K/UL
EOSINOPHIL NFR BLD: 2.1 %
ERYTHROCYTE [DISTWIDTH] IN BLOOD BY AUTOMATED COUNT: 12.2 %
EST. GFR  (AFRICAN AMERICAN): >60 ML/MIN/1.73 M^2
EST. GFR  (NON AFRICAN AMERICAN): >60 ML/MIN/1.73 M^2
GLUCOSE SERPL-MCNC: 116 MG/DL
HCT VFR BLD AUTO: 36.2 %
HGB BLD-MCNC: 12.3 G/DL
IMM GRANULOCYTES # BLD AUTO: 0.03 K/UL
IMM GRANULOCYTES NFR BLD AUTO: 0.3 %
LYMPHOCYTES # BLD AUTO: 3.6 K/UL
LYMPHOCYTES NFR BLD: 40.6 %
MCH RBC QN AUTO: 30.4 PG
MCHC RBC AUTO-ENTMCNC: 34 G/DL
MCV RBC AUTO: 89 FL
MONOCYTES # BLD AUTO: 0.6 K/UL
MONOCYTES NFR BLD: 6.8 %
NEUTROPHILS # BLD AUTO: 4.4 K/UL
NEUTROPHILS NFR BLD: 49.9 %
NRBC BLD-RTO: 0 /100 WBC
PLATELET # BLD AUTO: 224 K/UL
PMV BLD AUTO: 9.6 FL
POTASSIUM SERPL-SCNC: 3.8 MMOL/L
PROT SERPL-MCNC: 7.3 G/DL
RBC # BLD AUTO: 4.05 M/UL
SODIUM SERPL-SCNC: 138 MMOL/L
WBC # BLD AUTO: 8.76 K/UL

## 2018-07-12 PROCEDURE — 80175 DRUG SCREEN QUAN LAMOTRIGINE: CPT

## 2018-07-12 PROCEDURE — 80053 COMPREHEN METABOLIC PANEL: CPT

## 2018-07-12 PROCEDURE — 80177 DRUG SCRN QUAN LEVETIRACETAM: CPT

## 2018-07-12 PROCEDURE — 36415 COLL VENOUS BLD VENIPUNCTURE: CPT

## 2018-07-12 PROCEDURE — 95951 PR EEG MONITORING/VIDEORECORD: CPT | Mod: 26,,, | Performed by: PSYCHIATRY & NEUROLOGY

## 2018-07-12 PROCEDURE — 99223 1ST HOSP IP/OBS HIGH 75: CPT | Mod: AI,,, | Performed by: PSYCHIATRY & NEUROLOGY

## 2018-07-12 PROCEDURE — 95951 HC EEG MONITORING/VIDEO RECORD: CPT

## 2018-07-12 PROCEDURE — 85025 COMPLETE CBC W/AUTO DIFF WBC: CPT

## 2018-07-12 PROCEDURE — 25000003 PHARM REV CODE 250: Performed by: STUDENT IN AN ORGANIZED HEALTH CARE EDUCATION/TRAINING PROGRAM

## 2018-07-12 PROCEDURE — 20600001 HC STEP DOWN PRIVATE ROOM

## 2018-07-12 RX ORDER — LAMOTRIGINE 100 MG/1
100 TABLET ORAL NIGHTLY
Status: DISCONTINUED | OUTPATIENT
Start: 2018-07-12 | End: 2018-07-14

## 2018-07-12 RX ORDER — ONDANSETRON 8 MG/1
8 TABLET, ORALLY DISINTEGRATING ORAL EVERY 8 HOURS PRN
Status: DISCONTINUED | OUTPATIENT
Start: 2018-07-12 | End: 2018-07-15 | Stop reason: HOSPADM

## 2018-07-12 RX ORDER — DOCUSATE SODIUM 100 MG/1
100 CAPSULE, LIQUID FILLED ORAL DAILY PRN
Status: DISCONTINUED | OUTPATIENT
Start: 2018-07-12 | End: 2018-07-15 | Stop reason: HOSPADM

## 2018-07-12 RX ORDER — SODIUM CHLORIDE 0.9 % (FLUSH) 0.9 %
3 SYRINGE (ML) INJECTION
Status: DISCONTINUED | OUTPATIENT
Start: 2018-07-12 | End: 2018-07-15 | Stop reason: HOSPADM

## 2018-07-12 RX ORDER — LAMOTRIGINE 100 MG/1
200 TABLET ORAL DAILY
Status: DISCONTINUED | OUTPATIENT
Start: 2018-07-13 | End: 2018-07-14

## 2018-07-12 RX ORDER — CLONAZEPAM 0.5 MG/1
0.5 TABLET ORAL DAILY
Status: DISCONTINUED | OUTPATIENT
Start: 2018-07-13 | End: 2018-07-14

## 2018-07-12 RX ORDER — ACETAMINOPHEN 325 MG/1
650 TABLET ORAL EVERY 4 HOURS PRN
Status: DISCONTINUED | OUTPATIENT
Start: 2018-07-12 | End: 2018-07-15 | Stop reason: HOSPADM

## 2018-07-12 RX ADMIN — LAMOTRIGINE 100 MG: 100 TABLET ORAL at 08:07

## 2018-07-12 NOTE — H&P
"[facility]  Neurology-Epilepsy  History & Physical    Patient Name: Steven Ayala Jr.  MRN: [id number]   Admission Date: 7/12/2018  Code Status: Full Code   Attending Provider: CONSTANCE Reilly MD   Primary Care Physician: Primary Doctor No  Principal Problem:<principal problem not specified>    Subjective:     Chief Complaint:  EMU characterization of events      HPI:   Mr. Ayala is a 31 yo M with reported h/o epilepsy who presents to the EMU for characterization of events.  He is currently being followed by Dr. Copeland in , and has seen Dr. Beasley here at [facility].  He reports a h/o meningitis when he was [age] years old, and then GTCs starting at aged 10.  He had GTCs from ages [age], despite AEDs (AEDs from then not currently known).  He was then worked up in [place], where he reportedly had intracranial leads, but was found to have seizure focus close to eloquent cortex, and was deemed to be an unsuitable surgical candidate.  However, we was well-controlled on AEDs with no clinical events until his early 20s, when he lost his job and his insurance and was briefly off AEDs.  He then had multiple neurologist and AEDs changes, and has had a handful of events concerning for GTCs since that time.  His wife reports that she has only seen one event, approx six years ago, during which time he was in the shower, and she heard a noise, and came in to find him crumpled on the ground, with his eyes rolled back, with generalized body stiffness and occasional shakes progressing to full body shaking.  No right or left predominance.  No tongue or cheek biting.  No BB incontinence.      In addition to the events above, they report an additional type of event which has occurred nearly daily (sometimes up to ten times a day) for approximately 8 years, in which he feels "off," like he's "in a box."  He believes he has preserved awareness, but his thinking is a little foggy.  His wife states that he does not respond " "to her questions, he stares off, has repeated chewing movements (more on the R side of the face than the L), makes a repetitive finger-rubbing movement with his right hand, and occasionally repeats "I'm fine" over and over again.  This episode generally lasts about 30-60 seconds.      He is currently on lamictal 300mg daily + 200mg qhs; Keppra 1g bid, and Klonopin 0.5mg bid.    Past Medical History:   Diagnosis Date    Seizures        No past surgical history on file.    Review of patient's allergies indicates:  No Known Allergies    No current facility-administered medications on file prior to encounter.      Current Outpatient Prescriptions on File Prior to Encounter   Medication Sig    clonazePAM (KLONOPIN) 0.5 MG tablet Take 1 tablet (0.5 mg total) by mouth 2 (two) times daily.    LAMICTAL 100 mg tablet 300 mg in am and 200 in pm    levETIRAcetam (KEPPRA) 1000 MG tablet Take 1 tablet (1,000 mg total) by mouth 2 (two) times daily.     Continuous Infusions:    Family History     None        Social History Main Topics    Smoking status: Current Every Day Smoker    Smokeless tobacco: Not on file    Alcohol use Yes    Drug use: Unknown    Sexual activity: Not on file     Review of Systems   Respiratory: Negative for cough and shortness of breath.    Cardiovascular: Negative for chest pain and palpitations.   Gastrointestinal: Negative for constipation, diarrhea, nausea and vomiting.   Genitourinary: Negative for difficulty urinating, dysuria, frequency and urgency.   Musculoskeletal: Negative for arthralgias.   Skin: Negative for wound.   Neurological: Positive for seizures. Negative for weakness and headaches.   Psychiatric/Behavioral: Positive for decreased concentration. The patient is nervous/anxious.      Objective:     Vital Signs (Most Recent):    Vital Signs (24h Range):  BP: ()/()   Arterial Line BP: ()/()         There is no height or weight on file to calculate BMI.    Physical Exam "   Constitutional: He is oriented to person, place, and time. He appears well-developed and well-nourished. No distress.   HENT:   Head: Normocephalic and atraumatic.   Eyes: EOM are normal.   Pulmonary/Chest: Effort normal.   Musculoskeletal: Normal range of motion.   Neurological: He is alert and oriented to person, place, and time. He has a normal Finger-Nose-Finger Test and a normal Heel to Shin Test.   Reflex Scores:       Bicep reflexes are 2+ on the right side and 2+ on the left side.       Brachioradialis reflexes are 2+ on the right side and 2+ on the left side.       Patellar reflexes are 2+ on the right side and 2+ on the left side.  Skin: Skin is warm and dry.   Psychiatric: He has a normal mood and affect. His speech is normal and behavior is normal.   Nursing note and vitals reviewed.      NEUROLOGICAL EXAMINATION:     MENTAL STATUS   Oriented to person, place, and time.   Attention: normal. Concentration: normal.   Speech: speech is normal   Level of consciousness: alert    CRANIAL NERVES     CN III, IV, VI   Extraocular motions are normal.     CN V   Facial sensation intact.     CN VII   Facial expression full, symmetric.     CN VIII   Hearing: intact    MOTOR EXAM   Muscle bulk: normal  Overall muscle tone: normal    Strength   Right biceps: 5/5  Left biceps: 5/5  Right triceps: 5/5  Left triceps: 5/5  Right iliopsoas: 5/5  Left iliopsoas: 5/5    REFLEXES     Reflexes   Right brachioradialis: 2+  Left brachioradialis: 2+  Right biceps: 2+  Left biceps: 2+  Right patellar: 2+  Left patellar: 2+    SENSORY EXAM   Light touch normal.     GAIT AND COORDINATION      Coordination   Finger to nose coordination: normal  Heel to shin coordination: normal      Significant Labs:   Recent Lab Results     None          Significant Studies: I have reviewed and interpreted all pertinent imaging results/findings within the past 24 hours.    Assessment and Plan:     Complex partial epilepsy with generalization and  with intractable epilepsy    vEEG  PS, HV    Hold Keppra for now.  Decrease Klonopin to 0.5 mg daily for now.  (Unable to administer 0.25mg.)  Decrease Lamictal dose from 300mg daily + 200mg qhs to 200mg daily +100mg qhs.            VTE Risk Mitigation         Ordered     IP VTE LOW RISK PATIENT  Once      07/12/18 5738          Xiomara Saldana MD  Neurology-Epilepsy  [facility]

## 2018-07-12 NOTE — ASSESSMENT & PLAN NOTE
vEEG  PS, HV    Hold Keppra for now.  Decrease Klonopin to 0.5 mg daily for now.  (Unable to administer 0.25mg.)  Decrease Lamictal dose from 300mg daily + 200mg qhs to 200mg daily +100mg qhs.

## 2018-07-12 NOTE — HPI
"Mr. Ayala is a 33 yo M with reported h/o epilepsy who presents to the EMU for characterization of events.  He is currently being followed by Dr. Copeland in , and has seen [place] Beas[place] [facility]      He reports a h/o meningitis when he was [age] years old, and then GTCs starting at aged 10.  He had GTCs from ages [age], despite AEDs (AEDs from then not currently known).  He was then worked up in [place], where he reportedly had intracranial leads, but was found to have seizure focus close to eloquent cortex, and did not have surgery after being told that it would result in a visual field cut that would prevent him from ever being able to drive.  However, we was well-controlled on LTG with addition of LEV shortly after it became available with no clinical events until his early 20s, when he lost his job and his insurance and was briefly off AEDs.  He then had multiple neurologist and AEDs changes, and has had a handful of events concerning for GTCs since that time.  His wife reports that she has only seen one event, approx six years ago, during which time he was in the shower, and she heard a noise, and came in to find him crumpled on the ground, with his eyes rolled back, with generalized body stiffness and occasional shakes progressing to full body shaking.  No right or left predominance.  No tongue or cheek biting.  No BB incontinence.      In addition to the events above, they report an additional type of event for the past 7-8 years, in which he feels "off," like he's "in a box."  He believes he has preserved awareness, but his thinking is a little foggy for a few seconds.  His wife states that he does not respond to her questions, he stares off (often to the left) and occasionally repeats "I'm fine" over and over again.  These episodes initially occurred about once a month but have been gradually increasing in frequency over the intervening years and typically occur multiple times a day now.  He presents to the " EMU for characterization of these events.    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  He had been prescribed CLZ 0.5 bid with additional 0.5mg prn which he would only use every 1-2 weeks.  Wife feels that elimination of prn dose created a significant increase in anxiety due to concern that he would have a seizure which would in turn provoke seizures.  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       He is currently on lamictal 300/200; Keppra 1g bid, and Klonopin 0.5mg bid.

## 2018-07-12 NOTE — SUBJECTIVE & OBJECTIVE
Past Medical History:   Diagnosis Date    Seizures        No past surgical history on file.    Review of patient's allergies indicates:  No Known Allergies    No current facility-administered medications on file prior to encounter.      Current Outpatient Prescriptions on File Prior to Encounter   Medication Sig    clonazePAM (KLONOPIN) 0.5 MG tablet Take 1 tablet (0.5 mg total) by mouth 2 (two) times daily.    LAMICTAL 100 mg tablet 300 mg in am and 200 in pm    levETIRAcetam (KEPPRA) 1000 MG tablet Take 1 tablet (1,000 mg total) by mouth 2 (two) times daily.     Continuous Infusions:    Family History     None        Social History Main Topics    Smoking status: Current Every Day Smoker    Smokeless tobacco: Not on file    Alcohol use Yes    Drug use: Unknown    Sexual activity: Not on file     Review of Systems   Respiratory: Negative for cough and shortness of breath.    Cardiovascular: Negative for chest pain and palpitations.   Gastrointestinal: Negative for constipation, diarrhea, nausea and vomiting.   Genitourinary: Negative for difficulty urinating, dysuria, frequency and urgency.   Musculoskeletal: Negative for arthralgias.   Skin: Negative for wound.   Neurological: Positive for seizures. Negative for weakness and headaches.   Psychiatric/Behavioral: Positive for decreased concentration. The patient is nervous/anxious.      Objective:     Vital Signs (Most Recent):    Vital Signs (24h Range):  BP: ()/()   Arterial Line BP: ()/()         There is no height or weight on file to calculate BMI.    Physical Exam   Constitutional: He is oriented to person, place, and time. He appears well-developed and well-nourished. No distress.   HENT:   Head: Normocephalic and atraumatic.   Eyes: EOM are normal.   Pulmonary/Chest: Effort normal.   Musculoskeletal: Normal range of motion.   Neurological: He is alert and oriented to person, place, and time. He has a normal Finger-Nose-Finger Test and a normal Heel  to Shin Test.   Reflex Scores:       Bicep reflexes are 2+ on the right side and 2+ on the left side.       Brachioradialis reflexes are 2+ on the right side and 2+ on the left side.       Patellar reflexes are 2+ on the right side and 2+ on the left side.  Skin: Skin is warm and dry.   Psychiatric: He has a normal mood and affect. His speech is normal and behavior is normal.   Nursing note and vitals reviewed.      NEUROLOGICAL EXAMINATION:     MENTAL STATUS   Oriented to person, place, and time.   Attention: normal. Concentration: normal.   Speech: speech is normal   Level of consciousness: alert    CRANIAL NERVES     CN III, IV, VI   Extraocular motions are normal.     CN V   Facial sensation intact.     CN VII   Facial expression full, symmetric.     CN VIII   Hearing: intact    MOTOR EXAM   Muscle bulk: normal  Overall muscle tone: normal    Strength   Right biceps: 5/5  Left biceps: 5/5  Right triceps: 5/5  Left triceps: 5/5  Right iliopsoas: 5/5  Left iliopsoas: 5/5    REFLEXES     Reflexes   Right brachioradialis: 2+  Left brachioradialis: 2+  Right biceps: 2+  Left biceps: 2+  Right patellar: 2+  Left patellar: 2+    SENSORY EXAM   Light touch normal.     GAIT AND COORDINATION      Coordination   Finger to nose coordination: normal  Heel to shin coordination: normal      Significant Labs:   Recent Lab Results     None          Significant Studies: I have reviewed and interpreted all pertinent imaging results/findings within the past 24 hours.

## 2018-07-13 PROCEDURE — 25000003 PHARM REV CODE 250: Performed by: STUDENT IN AN ORGANIZED HEALTH CARE EDUCATION/TRAINING PROGRAM

## 2018-07-13 PROCEDURE — 99233 SBSQ HOSP IP/OBS HIGH 50: CPT | Mod: ,,, | Performed by: PSYCHIATRY & NEUROLOGY

## 2018-07-13 PROCEDURE — 20600001 HC STEP DOWN PRIVATE ROOM

## 2018-07-13 PROCEDURE — 95951 HC EEG MONITORING/VIDEO RECORD: CPT

## 2018-07-13 RX ORDER — DIPHENHYDRAMINE HCL 50 MG
50 CAPSULE ORAL ONCE
Status: COMPLETED | OUTPATIENT
Start: 2018-07-14 | End: 2018-07-14

## 2018-07-13 RX ORDER — TRAMADOL HYDROCHLORIDE 50 MG/1
100 TABLET ORAL ONCE
Status: COMPLETED | OUTPATIENT
Start: 2018-07-14 | End: 2018-07-14

## 2018-07-13 RX ADMIN — LAMOTRIGINE 200 MG: 100 TABLET ORAL at 08:07

## 2018-07-13 RX ADMIN — LAMOTRIGINE 100 MG: 100 TABLET ORAL at 09:07

## 2018-07-13 RX ADMIN — CLONAZEPAM 0.5 MG: 0.5 TABLET ORAL at 08:07

## 2018-07-13 NOTE — PLAN OF CARE
PCP:Primary Doctor No    Extended Emergency Contact Information  Primary Emergency Contact: Katherine Ayala  Address: [address] [place] [address] [place]  Home Phone: [phone]  Work Phone: [phone]  Mobile Phone: [phone]  Relation: Spouse    [facility] [employer] - [place], [place] - [address]  [address] [facility] [address]  Phone: [phone] Fax: [phone]    Payor: [employer] / Plan: [employer] OPEN ACCESS MANAGED CHOICE / Product Type: Commercial /     Patient was independent living with his wife, 10 yr old son , 8 yr old daughter and 2 yr old son prior to hospitalization. Patient has no discharge needs at this time. Cm will continue to follow.     07/13/18 0810   Discharge Assessment   Assessment Type Discharge Planning Assessment   Confirmed/corrected address and phone number on facesheet? Yes   Assessment information obtained from? Patient   Expected Length of Stay (days) 2   Communicated expected length of stay with patient/caregiver no   Prior to hospitilization cognitive status: Alert/Oriented   Prior to hospitalization functional status: Independent   Current cognitive status: Alert/Oriented   Current Functional Status: Independent   Facility Arrived From: Home   Lives With child(yesica), dependent;spouse   Able to Return to Prior Arrangements yes   Is patient able to care for self after discharge? Yes   Who are your caregiver(s) and their phone number(s)? Katherine Ayala (spouse) [phone]   Patient's perception of discharge disposition home or selfcare   Readmission Within The Last 30 Days no previous admission in last 30 days   Patient currently being followed by outpatient case management? No   Patient currently receives any other outside agency services? No   Equipment Currently Used at Home none   Do you have any problems affording any of your prescribed medications? No   Is the patient taking medications as prescribed? yes   Does the patient have  transportation home? Yes   Transportation Available car;family or friend will provide   Does the patient receive services at the [facility]? No   Discharge Plan A Home with family   Discharge Plan B Home with family   Patient/Family In Agreement With Plan yes

## 2018-07-13 NOTE — ASSESSMENT & PLAN NOTE
Per history.  Patient reports prior seizure workup with intracranial lead placement in [place] when he was a teenager.      vEEG  PS, HV    Hold Keppra for now.  Home Klonopin dose decreased to 0.5 mg daily for now.  (Unable to administer 0.25mg; ordered to prevent w/d / w/d seizures.)  Home Lamictal dose decreased from 300mg daily + 200mg qhs to 200mg daily +100mg qhs while in house.  Consider additional downtitration inhouse to capture additional events, if necessary.    Sleep deprive tonight.  Benadryl and tramadol tomorrow am.

## 2018-07-13 NOTE — SUBJECTIVE & OBJECTIVE
"Interval History: Clinical events at 10:30 pm last night and 11am today in which the patient had an "out of body experience."      Current Facility-Administered Medications   Medication Dose Route Frequency Provider Last Rate Last Dose    acetaminophen tablet 650 mg  650 mg Oral Q4H PRN Xiomara Saldana MD        clonazePAM tablet 0.5 mg  0.5 mg Oral Daily Xiomara Saldana MD   0.5 mg at 07/13/18 0843    [START ON 7/14/2018] diphenhydrAMINE capsule 50 mg  50 mg Oral Once Xiomara Saldana MD        docusate sodium capsule 100 mg  100 mg Oral Daily PRN Xiomara Saldana MD        lamoTRIgine tablet 100 mg  100 mg Oral QHS Xiomara Saldana MD   100 mg at 07/12/18 2037    lamoTRIgine tablet 200 mg  200 mg Oral Daily Xiomara Saldana MD   200 mg at 07/13/18 0843    ondansetron disintegrating tablet 8 mg  8 mg Oral Q8H PRN Xiomara Saldana MD        sodium chloride 0.9% flush 3 mL  3 mL Intravenous PRN Xiomara Saldana MD        [START ON 7/14/2018] traMADol tablet 100 mg  100 mg Oral Once Xiomara Saldana MD         Review of Systems   Respiratory: Negative for cough and shortness of breath.    Cardiovascular: Negative for chest pain and palpitations.   Gastrointestinal: Negative for constipation, diarrhea, nausea and vomiting.   Genitourinary: Negative for difficulty urinating, dysuria, frequency and urgency.   Musculoskeletal: Negative for arthralgias.   Skin: Negative for wound.   Neurological: Positive for seizures. Negative for weakness and headaches.   Psychiatric/Behavioral: Positive for decreased concentration. The patient is nervous/anxious.      Objective:     Vital Signs (Most Recent):  Temp: 96.7 °F (35.9 °C) (07/13/18 1614)  Pulse: 75 (07/13/18 1614)  Resp: 18 (07/13/18 1614)  BP: 138/79 (07/13/18 1614)  SpO2: 97 % (07/13/18 1614) Vital Signs (24h Range):  Temp:  [96.7 °F (35.9 °C)-98.6 °F (37 °C)] 96.7 °F (35.9 °C)  Pulse:  [67-95] 75  Resp:  [16-20] 18  SpO2:  [92 %-97 %] " 97 %  BP: (104-148)/(55-79) 138/79     Weight: 99.8 kg (220 lb)  Body mass index is 29.84 kg/m².    Physical Exam   Constitutional: He is oriented to person, place, and time. He appears well-developed and well-nourished. No distress.   HENT:   Head: Normocephalic and atraumatic.   Eyes: EOM are normal.   Pulmonary/Chest: Effort normal.   Musculoskeletal: Normal range of motion.   Neurological: He is alert and oriented to person, place, and time. He has a normal Finger-Nose-Finger Test and a normal Heel to Shin Test.   Reflex Scores:       Bicep reflexes are 2+ on the right side and 2+ on the left side.       Brachioradialis reflexes are 2+ on the right side and 2+ on the left side.       Patellar reflexes are 2+ on the right side and 2+ on the left side.  Skin: Skin is warm and dry.   Psychiatric: He has a normal mood and affect. His speech is normal and behavior is normal.   Nursing note and vitals reviewed.      NEUROLOGICAL EXAMINATION:     MENTAL STATUS   Oriented to person, place, and time.   Attention: normal. Concentration: normal.   Speech: speech is normal   Level of consciousness: alert    CRANIAL NERVES     CN II   Visual fields full to confrontation.     CN III, IV, VI   Extraocular motions are normal.     CN V   Facial sensation intact.     CN VII   Facial expression full, symmetric.     CN VIII   Hearing: intact    CN XI   CN XI normal.     CN XII   CN XII normal.     MOTOR EXAM   Muscle bulk: normal  Overall muscle tone: normal    Strength   Right biceps: 5/5  Left biceps: 5/5  Right triceps: 5/5  Left triceps: 5/5  Right iliopsoas: 5/5  Left iliopsoas: 5/5    REFLEXES     Reflexes   Right brachioradialis: 2+  Left brachioradialis: 2+  Right biceps: 2+  Left biceps: 2+  Right patellar: 2+  Left patellar: 2+    SENSORY EXAM   Light touch normal.     GAIT AND COORDINATION      Coordination   Finger to nose coordination: normal  Heel to shin coordination: normal      Significant Labs:   CBC:   Recent  Labs  Lab 07/12/18  1847   WBC 8.76   HGB 12.3*   HCT 36.2*        CMP:   Recent Labs  Lab 07/12/18  1847   *      K 3.8      CO2 24   BUN 13   CREATININE 0.9   CALCIUM 9.7   PROT 7.3   ALBUMIN 4.2   BILITOT 0.2   ALKPHOS 76   AST 17   ALT 20   ANIONGAP 10   EGFRNONAA >60.0       Significant Studies: I have reviewed and interpreted all pertinent imaging results/findings within the past 24 hours.

## 2018-07-13 NOTE — PROGRESS NOTES
"[facility]  Neurology-Epilepsy  Progress Note    Patient Name: Steven Ayala Jr.  MRN: [id number]  Admission Date: 7/12/2018  Hospital Length of Stay: 1 days  Code Status: Full Code   Attending Provider: CONSTANCE Reilly MD  Primary Care Physician: Primary Doctor No   Principal Problem:<principal problem not specified>    Subjective:     Hospital Course:   7/13:  Admitted to EMU  7/14:  One clinical event at 10:30pm.    Interval History: Clinical events at 10:30 pm last night and 11am today in which the patient had an "out of body experience."      Current Facility-Administered Medications   Medication Dose Route Frequency Provider Last Rate Last Dose    acetaminophen tablet 650 mg  650 mg Oral Q4H PRN Xiomara Saldana MD        clonazePAM tablet 0.5 mg  0.5 mg Oral Daily Xiomara Saldana MD   0.5 mg at 07/13/18 0843    [START ON 7/14/2018] diphenhydrAMINE capsule 50 mg  50 mg Oral Once Xiomara Saldana MD        docusate sodium capsule 100 mg  100 mg Oral Daily PRN Xiomara Saldana MD        lamoTRIgine tablet 100 mg  100 mg Oral QHS Xiomara Saldana MD   100 mg at 07/12/18 2037    lamoTRIgine tablet 200 mg  200 mg Oral Daily Xiomara Saldana MD   200 mg at 07/13/18 0843    ondansetron disintegrating tablet 8 mg  8 mg Oral Q8H PRN Xiomara Saldana MD        sodium chloride 0.9% flush 3 mL  3 mL Intravenous PRN Xiomara Saldana MD        [START ON 7/14/2018] traMADol tablet 100 mg  100 mg Oral Once Xiomara Saldana MD         Review of Systems   Respiratory: Negative for cough and shortness of breath.    Cardiovascular: Negative for chest pain and palpitations.   Gastrointestinal: Negative for constipation, diarrhea, nausea and vomiting.   Genitourinary: Negative for difficulty urinating, dysuria, frequency and urgency.   Musculoskeletal: Negative for arthralgias.   Skin: Negative for wound.   Neurological: Positive for seizures. Negative for weakness and " headaches.   Psychiatric/Behavioral: Positive for decreased concentration. The patient is nervous/anxious.      Objective:     Vital Signs (Most Recent):  Temp: 96.7 °F (35.9 °C) (07/13/18 1614)  Pulse: 75 (07/13/18 1614)  Resp: 18 (07/13/18 1614)  BP: 138/79 (07/13/18 1614)  SpO2: 97 % (07/13/18 1614) Vital Signs (24h Range):  Temp:  [96.7 °F (35.9 °C)-98.6 °F (37 °C)] 96.7 °F (35.9 °C)  Pulse:  [67-95] 75  Resp:  [16-20] 18  SpO2:  [92 %-97 %] 97 %  BP: (104-148)/(55-79) 138/79     Weight: 99.8 kg (220 lb)  Body mass index is 29.84 kg/m².    Physical Exam   Constitutional: He is oriented to person, place, and time. He appears well-developed and well-nourished. No distress.   HENT:   Head: Normocephalic and atraumatic.   Eyes: EOM are normal.   Pulmonary/Chest: Effort normal.   Musculoskeletal: Normal range of motion.   Neurological: He is alert and oriented to person, place, and time. He has a normal Finger-Nose-Finger Test and a normal Heel to Shin Test.   Reflex Scores:       Bicep reflexes are 2+ on the right side and 2+ on the left side.       Brachioradialis reflexes are 2+ on the right side and 2+ on the left side.       Patellar reflexes are 2+ on the right side and 2+ on the left side.  Skin: Skin is warm and dry.   Psychiatric: He has a normal mood and affect. His speech is normal and behavior is normal.   Nursing note and vitals reviewed.      NEUROLOGICAL EXAMINATION:     MENTAL STATUS   Oriented to person, place, and time.   Attention: normal. Concentration: normal.   Speech: speech is normal   Level of consciousness: alert    CRANIAL NERVES     CN II   Visual fields full to confrontation.     CN III, IV, VI   Extraocular motions are normal.     CN V   Facial sensation intact.     CN VII   Facial expression full, symmetric.     CN VIII   Hearing: intact    CN XI   CN XI normal.     CN XII   CN XII normal.     MOTOR EXAM   Muscle bulk: normal  Overall muscle tone: normal    Strength   Right biceps:  5/5  Left biceps: 5/5  Right triceps: 5/5  Left triceps: 5/5  Right iliopsoas: 5/5  Left iliopsoas: 5/5    REFLEXES     Reflexes   Right brachioradialis: 2+  Left brachioradialis: 2+  Right biceps: 2+  Left biceps: 2+  Right patellar: 2+  Left patellar: 2+    SENSORY EXAM   Light touch normal.     GAIT AND COORDINATION      Coordination   Finger to nose coordination: normal  Heel to shin coordination: normal      Significant Labs:   CBC:   Recent Labs  Lab 07/12/18  1847   WBC 8.76   HGB 12.3*   HCT 36.2*        CMP:   Recent Labs  Lab 07/12/18 1847   *      K 3.8      CO2 24   BUN 13   CREATININE 0.9   CALCIUM 9.7   PROT 7.3   ALBUMIN 4.2   BILITOT 0.2   ALKPHOS 76   AST 17   ALT 20   ANIONGAP 10   EGFRNONAA >60.0       Significant Studies: I have reviewed and interpreted all pertinent imaging results/findings within the past 24 hours.    Assessment and Plan:     Complex partial epilepsy with generalization and with intractable epilepsy    Per history.  Patient reports prior seizure workup with intracranial lead placement in [place] when he was a teenager.      vEEG  PS, HV    Hold Keppra for now.  Home Klonopin dose decreased to 0.5 mg daily for now.  (Unable to administer 0.25mg; ordered to prevent w/d / w/d seizures.)  Home Lamictal dose decreased from 300mg daily + 200mg qhs to 200mg daily +100mg qhs while in house.  Consider additional downtitration inhouse to capture additional events, if necessary.    Sleep deprive tonight.  Benadryl and tramadol tomorrow am.            VTE Risk Mitigation         Ordered     IP VTE LOW RISK PATIENT  Once      07/12/18 1801          Xiomara Saldana MD  Neurology-Epilepsy  [facility]

## 2018-07-13 NOTE — PLAN OF CARE
Problem: Seizure Disorder/Epilepsy (Adult)  Goal: Signs and Symptoms of Listed Potential Problems Will be Absent, Minimized or Managed (Seizure Disorder/Epilepsy)  Signs and symptoms of listed potential problems will be absent, minimized or managed by discharge/transition of care (reference Seizure Disorder/Epilepsy (Adult) CPG).  Fall precautions maintained, seizure precautions maintained, VSS, wife at bedside, pt up with one assist, call bell light within reach, no skin breakdown noted, pt AAOX4, no distress noted, will continue to monitor.

## 2018-07-14 PROCEDURE — 99233 SBSQ HOSP IP/OBS HIGH 50: CPT | Mod: ,,, | Performed by: PSYCHIATRY & NEUROLOGY

## 2018-07-14 PROCEDURE — 25000003 PHARM REV CODE 250: Performed by: PSYCHIATRY & NEUROLOGY

## 2018-07-14 PROCEDURE — 25000003 PHARM REV CODE 250: Performed by: STUDENT IN AN ORGANIZED HEALTH CARE EDUCATION/TRAINING PROGRAM

## 2018-07-14 PROCEDURE — A4216 STERILE WATER/SALINE, 10 ML: HCPCS | Performed by: STUDENT IN AN ORGANIZED HEALTH CARE EDUCATION/TRAINING PROGRAM

## 2018-07-14 PROCEDURE — 20600001 HC STEP DOWN PRIVATE ROOM

## 2018-07-14 PROCEDURE — 95951 HC EEG MONITORING/VIDEO RECORD: CPT

## 2018-07-14 RX ORDER — LAMOTRIGINE 150 MG/1
300 TABLET ORAL DAILY
Status: DISCONTINUED | OUTPATIENT
Start: 2018-07-15 | End: 2018-07-15 | Stop reason: HOSPADM

## 2018-07-14 RX ORDER — LAMOTRIGINE 100 MG/1
200 TABLET ORAL NIGHTLY
Status: DISCONTINUED | OUTPATIENT
Start: 2018-07-14 | End: 2018-07-15 | Stop reason: HOSPADM

## 2018-07-14 RX ORDER — LEVETIRACETAM 750 MG/1
1500 TABLET ORAL 2 TIMES DAILY
Status: DISCONTINUED | OUTPATIENT
Start: 2018-07-14 | End: 2018-07-15 | Stop reason: HOSPADM

## 2018-07-14 RX ORDER — CLONAZEPAM 0.5 MG/1
0.5 TABLET ORAL 2 TIMES DAILY
Status: DISCONTINUED | OUTPATIENT
Start: 2018-07-14 | End: 2018-07-15 | Stop reason: HOSPADM

## 2018-07-14 RX ADMIN — LAMOTRIGINE 200 MG: 100 TABLET ORAL at 09:07

## 2018-07-14 RX ADMIN — CLONAZEPAM 0.5 MG: 0.5 TABLET ORAL at 09:07

## 2018-07-14 RX ADMIN — SODIUM CHLORIDE, PRESERVATIVE FREE 3 ML: 5 INJECTION INTRAVENOUS at 09:07

## 2018-07-14 RX ADMIN — LEVETIRACETAM 1500 MG: 750 TABLET ORAL at 09:07

## 2018-07-14 RX ADMIN — DIPHENHYDRAMINE HYDROCHLORIDE 50 MG: 50 CAPSULE ORAL at 06:07

## 2018-07-14 RX ADMIN — TRAMADOL HYDROCHLORIDE 100 MG: 50 TABLET, FILM COATED ORAL at 06:07

## 2018-07-14 NOTE — PLAN OF CARE
Problem: Patient Care Overview  Goal: Plan of Care Review  Outcome: Ongoing (interventions implemented as appropriate)  AAOx4, no complaints of pain or distress. Patient was sleep deprived till 4 a.m., no seizure activity overnight. Bed alarm activated and bed rails padded. Spouse at bedside. Will continue to monitor.

## 2018-07-15 VITALS
HEIGHT: 72 IN | DIASTOLIC BLOOD PRESSURE: 74 MMHG | RESPIRATION RATE: 18 BRPM | SYSTOLIC BLOOD PRESSURE: 114 MMHG | OXYGEN SATURATION: 94 % | WEIGHT: 220 LBS | HEART RATE: 88 BPM | TEMPERATURE: 96 F | BODY MASS INDEX: 29.8 KG/M2

## 2018-07-15 PROCEDURE — 25000003 PHARM REV CODE 250: Performed by: PSYCHIATRY & NEUROLOGY

## 2018-07-15 PROCEDURE — 99233 SBSQ HOSP IP/OBS HIGH 50: CPT | Mod: ,,, | Performed by: PSYCHIATRY & NEUROLOGY

## 2018-07-15 RX ORDER — LEVETIRACETAM 750 MG/1
1500 TABLET ORAL 2 TIMES DAILY
Qty: 360 TABLET | Refills: 1 | Status: SHIPPED | OUTPATIENT
Start: 2018-07-15 | End: 2019-07-15

## 2018-07-15 RX ADMIN — LAMOTRIGINE 300 MG: 150 TABLET ORAL at 09:07

## 2018-07-15 RX ADMIN — CLONAZEPAM 0.5 MG: 0.5 TABLET ORAL at 09:07

## 2018-07-15 RX ADMIN — LEVETIRACETAM 1500 MG: 750 TABLET ORAL at 09:07

## 2018-07-15 NOTE — PLAN OF CARE
"Problem: Fall Risk (Adult)  Intervention: Safety Promotion/Fall Prevention  PT AAOX4, VSS, in NAD. Patient bed in low position. Call light in reach. Patient and spouse refuse bed alarm, spouse states "The doctor told me it was OK for me to take him to the restroom". Patient instructed to call for assistance. EMU in use. No significant events overnight. Will continue to monitor.         "

## 2018-07-15 NOTE — PROGRESS NOTES
[facility]  Neurology-Epilepsy  Progress Note    Patient Name: Steven Ayala Jr.  MRN: [id number]  Admission Date: 7/12/2018  Hospital Length of Stay: 3 days  Code Status: Full Code   Attending Provider: CONSTANCE Reilly MD  Primary Care Physician: Primary Doctor No   Principal Problem:<principal problem not specified>    Subjective:     Hospital Course:   7/12-13:  Admitted to EMU  7/13-14:  3 seizures from O2  7/14-15:  1 seizure from O2    Interval History: Clinical event yesterday evening      Current Facility-Administered Medications   Medication Dose Route Frequency Provider Last Rate Last Dose    acetaminophen tablet 650 mg  650 mg Oral Q4H PRN Xiomara Saldana MD        clonazePAM tablet 0.5 mg  0.5 mg Oral BID Henry Avendaño MD   0.5 mg at 07/15/18 0920    docusate sodium capsule 100 mg  100 mg Oral Daily PRN Xiomara Saldana MD        lamoTRIgine tablet 200 mg  200 mg Oral QHS Henry Avendaño MD   200 mg at 07/14/18 2106    lamoTRIgine tablet 300 mg  300 mg Oral Daily Henry Avendaño MD   300 mg at 07/15/18 0920    levETIRAcetam tablet 1,500 mg  1,500 mg Oral BID Henry Avendaño MD   1,500 mg at 07/15/18 0920    ondansetron disintegrating tablet 8 mg  8 mg Oral Q8H PRN Xiomara Saldana MD        sodium chloride 0.9% flush 3 mL  3 mL Intravenous PRN Xiomara Saldana MD   3 mL at 07/14/18 2107     Review of Systems   Respiratory: Negative for cough and shortness of breath.    Cardiovascular: Negative for chest pain and palpitations.   Gastrointestinal: Negative for constipation, diarrhea, nausea and vomiting.   Genitourinary: Negative for difficulty urinating, dysuria, frequency and urgency.   Musculoskeletal: Negative for arthralgias.   Skin: Negative for wound.   Neurological: Positive for seizures. Negative for weakness and headaches.   Psychiatric/Behavioral: Positive for decreased concentration. The patient is nervous/anxious.      Objective:      Vital Signs (Most Recent):  Temp: 98.1 °F (36.7 °C) (07/15/18 0842)  Pulse: 75 (07/15/18 0842)  Resp: 19 (07/15/18 0842)  BP: 110/66 (07/15/18 0842)  SpO2: (!) 94 % (07/15/18 0842) Vital Signs (24h Range):  Temp:  [97 °F (36.1 °C)-98.5 °F (36.9 °C)] 98.1 °F (36.7 °C)  Pulse:  [] 75  Resp:  [18-20] 19  SpO2:  [93 %-95 %] 94 %  BP: (103-128)/(55-78) 110/66     Weight: 99.8 kg (220 lb)  Body mass index is 29.84 kg/m².    Physical Exam   Constitutional: He is oriented to person, place, and time. He appears well-developed and well-nourished. No distress.   HENT:   Head: Normocephalic and atraumatic.   Eyes: EOM are normal.   Pulmonary/Chest: Effort normal.   Musculoskeletal: Normal range of motion.   Neurological: He is alert and oriented to person, place, and time. He has a normal Finger-Nose-Finger Test and a normal Heel to Shin Test.   Reflex Scores:       Bicep reflexes are 2+ on the right side and 2+ on the left side.       Brachioradialis reflexes are 2+ on the right side and 2+ on the left side.       Patellar reflexes are 2+ on the right side and 2+ on the left side.  Skin: Skin is warm and dry.   Psychiatric: He has a normal mood and affect. His speech is normal and behavior is normal.   Nursing note and vitals reviewed.      NEUROLOGICAL EXAMINATION:     MENTAL STATUS   Oriented to person, place, and time.   Attention: normal. Concentration: normal.   Speech: speech is normal   Level of consciousness: alert    CRANIAL NERVES     CN II   Visual fields full to confrontation.     CN III, IV, VI   Extraocular motions are normal.     CN V   Facial sensation intact.     CN VII   Facial expression full, symmetric.     CN VIII   Hearing: intact    CN XI   CN XI normal.     CN XII   CN XII normal.     MOTOR EXAM   Muscle bulk: normal  Overall muscle tone: normal    Strength   Right biceps: 5/5  Left biceps: 5/5  Right triceps: 5/5  Left triceps: 5/5  Right iliopsoas: 5/5  Left iliopsoas: 5/5    REFLEXES      Reflexes   Right brachioradialis: 2+  Left brachioradialis: 2+  Right biceps: 2+  Left biceps: 2+  Right patellar: 2+  Left patellar: 2+    SENSORY EXAM   Light touch normal.     GAIT AND COORDINATION      Coordination   Finger to nose coordination: normal  Heel to shin coordination: normal      Significant Labs:   CBC: No results for input(s): WBC, HGB, HCT, PLT in the last 48 hours.  CMP: No results for input(s): GLU, NA, K, CL, CO2, BUN, CREATININE, CALCIUM, MG, PROT, ALBUMIN, BILITOT, ALKPHOS, AST, ALT, ANIONGAP, EGFRNONAA in the last 48 hours.    Significant Studies: I have reviewed and interpreted all pertinent imaging results/findings within the past 24 hours.    Assessment and Plan:     Complex partial epilepsy with generalization and with intractable epilepsy    Per history.  Patient reports prior seizure workup with intracranial lead placement in [place] when he was a teenager.      vEEG  PS, HV    Discharge today.            VTE Risk Mitigation         Ordered     IP VTE LOW RISK PATIENT  Once      07/12/18 1801          Henry Avendaño MD  Neurology-Epilepsy  [facility]

## 2018-07-15 NOTE — ASSESSMENT & PLAN NOTE
Per history.  Patient reports prior seizure workup with intracranial lead placement in [place] when he was a teenager.      vEEG  PS, HV    Discharge today.

## 2018-07-15 NOTE — ASSESSMENT & PLAN NOTE
Per history.  Patient reports prior seizure workup with intracranial lead placement in [place] when he was a teenager.      vEEG  PS, HV    Start meds at home dose tonight.

## 2018-07-15 NOTE — DISCHARGE SUMMARY
"[facility]  Neurology-Epilepsy  Discharge Summary      Patient Name: Steven Ayala Jr.  MRN: [id number]  Admission Date: 7/12/2018  Hospital Length of Stay: 3 days  Discharge Date and Time: No discharge date for patient encounter.  Attending Physician: Henry Avendaño MD  Discharging Provider: Henry Avendaño MD  Primary Care Physician: Primary Doctor No    HPI:   Mr. Ayala is a 33 yo M with reported h/o epilepsy who presents to the EMU for characterization of events.  He is currently being followed by Dr. Copeland in , and has seen Dr. Beasley here at [facility].      He reports a h/o meningitis when he was [age] years old, and then GTCs starting at aged 10.  He had GTCs from ages [age], despite AEDs (AEDs from then not currently known).  He was then worked up in [place], where he reportedly had intracranial leads, but was found to have seizure focus close to eloquent cortex, and did not have surgery after being told that it would result in a visual field cut that would prevent him from ever being able to drive.  However, we was well-controlled on LTG with addition of LEV shortly after it became available with no clinical events until his early 20s, when he lost his job and his insurance and was briefly off AEDs.  He then had multiple neurologist and AEDs changes, and has had a handful of events concerning for GTCs since that time.  His wife reports that she has only seen one event, approx six years ago, during which time he was in the shower, and she heard a noise, and came in to find him crumpled on the ground, with his eyes rolled back, with generalized body stiffness and occasional shakes progressing to full body shaking.  No right or left predominance.  No tongue or cheek biting.  No BB incontinence.      In addition to the events above, they report an additional type of event for the past 7-8 years, in which he feels "off," like he's "in a box."  He believes he has preserved awareness, but his " "thinking is a little foggy for a few seconds.  His wife states that he does not respond to her questions, he stares off (often to the left) and occasionally repeats "I'm fine" over and over again.  These episodes initially occurred about once a month but have been gradually increasing in frequency over the intervening years and typically occur multiple times a day now.  He presents to the EMU for characterization of these events.    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  He had been prescribed CLZ 0.5 bid with additional 0.5mg prn which he would only use every 1-2 weeks.  Wife feels that elimination of prn dose created a significant increase in anxiety due to concern that he would have a seizure which would in turn provoke seizures.  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       He is currently on lamictal 300/200; Keppra 1g bid, and Klonopin 0.5mg bid.    * No surgery found *     Indwelling Lines/Drains at time of discharge:   Lines/Drains/Airways          No matching active lines, drains, or airways        Hospital Course:   7/12-13:  Admitted to EMU  7/13-14:  3 seizures from O2  7/14-15:  1 seizure from O2    Consults:     Significant Labs: All pertinent lab results from the past 24 hours have been reviewed.    Significant Studies: I have reviewed all pertinent imaging results/findings within the past 24 hours.    Pending Diagnostic Studies:     Procedure Component Value Units Date/Time    Lamotrigine level [[id number]] Collected:  07/12/18 1846    Order Status:  Sent Lab Status:  In process Updated:  07/12/18 1851    Specimen:  Blood from Blood     Levetiracetam level [[id number]] Collected:  07/12/18 1846    Order Status:  Sent Lab Status:  In process " Updated:  07/12/18 1851    Specimen:  Blood from Blood         Final Active Diagnoses:    Diagnosis Date Noted POA    PRINCIPAL PROBLEM:  Complex partial epilepsy with generalization and with intractable epilepsy [G40.219]  Yes      Problems Resolved During this Admission:    Diagnosis Date Noted Date Resolved POA       No new Assessment & Plan notes have been filed under this hospital service since the last note was generated.  Service: Epilepsy      Discharged Condition: good    Disposition: Home or Self Care    Follow Up:  Follow-up Information     Henry Avendaño MD In 1 month.    Specialty:  Neurology  Contact information:  [address] [place] [id number]  [phone]                 Patient Instructions:   No discharge procedures on file.    Medications:  Reconciled Home Medications:      Medication List      CHANGE how you take these medications    levETIRAcetam 750 MG Tab  Commonly known as:  KEPPRA  Take 2 tablets (1,500 mg total) by mouth 2 (two) times daily.  What changed:  · medication strength  · how much to take        CONTINUE taking these medications    clonazePAM 0.5 MG tablet  Commonly known as:  KLONOPIN  Take 1 tablet (0.5 mg total) by mouth 2 (two) times daily.     LaMICtal 100 MG tablet  Generic drug:  lamoTRIgine  300 mg in am and 200 in pm          Time spent on the discharge of patient: 40 minutes    Henry Avendaño MD  Neurology-Epilepsy  [facility]

## 2018-07-15 NOTE — NURSING
EMU NURSING DISCHARGE NOTE:    Did patient understand the diagnosis? yes      Did patient understand the seizure medications: dose or medication changes (if any)yes      Did patient understand regarding seizure precautions and restrictions?yes      Did patient understand regarding follow-up?yes

## 2018-07-15 NOTE — SUBJECTIVE & OBJECTIVE
Interval History: Clinical events yesterday evening      Current Facility-Administered Medications   Medication Dose Route Frequency Provider Last Rate Last Dose    acetaminophen tablet 650 mg  650 mg Oral Q4H PRN Xiomara Saldana MD        clonazePAM tablet 0.5 mg  0.5 mg Oral BID Henry Avendaño MD   0.5 mg at 07/15/18 0920    docusate sodium capsule 100 mg  100 mg Oral Daily PRN Xiomara Saldana MD        lamoTRIgine tablet 200 mg  200 mg Oral QHS Henry Avendaño MD   200 mg at 07/14/18 2106    lamoTRIgine tablet 300 mg  300 mg Oral Daily Henry Avendaño MD   300 mg at 07/15/18 0920    levETIRAcetam tablet 1,500 mg  1,500 mg Oral BID Henry Avendaño MD   1,500 mg at 07/15/18 0920    ondansetron disintegrating tablet 8 mg  8 mg Oral Q8H PRN Xiomara Saldana MD        sodium chloride 0.9% flush 3 mL  3 mL Intravenous PRN Xiomara Saldana MD   3 mL at 07/14/18 2107     Review of Systems   Respiratory: Negative for cough and shortness of breath.    Cardiovascular: Negative for chest pain and palpitations.   Gastrointestinal: Negative for constipation, diarrhea, nausea and vomiting.   Genitourinary: Negative for difficulty urinating, dysuria, frequency and urgency.   Musculoskeletal: Negative for arthralgias.   Skin: Negative for wound.   Neurological: Positive for seizures. Negative for weakness and headaches.   Psychiatric/Behavioral: Positive for decreased concentration. The patient is nervous/anxious.      Objective:     Vital Signs (Most Recent):  Temp: 98.1 °F (36.7 °C) (07/15/18 0842)  Pulse: 75 (07/15/18 0842)  Resp: 19 (07/15/18 0842)  BP: 110/66 (07/15/18 0842)  SpO2: (!) 94 % (07/15/18 0842) Vital Signs (24h Range):  Temp:  [97 °F (36.1 °C)-98.5 °F (36.9 °C)] 98.1 °F (36.7 °C)  Pulse:  [] 75  Resp:  [18-20] 19  SpO2:  [93 %-95 %] 94 %  BP: (103-128)/(55-78) 110/66     Weight: 99.8 kg (220 lb)  Body mass index is 29.84 kg/m².    Physical Exam   Constitutional: He is  oriented to person, place, and time. He appears well-developed and well-nourished. No distress.   HENT:   Head: Normocephalic and atraumatic.   Eyes: EOM are normal.   Pulmonary/Chest: Effort normal.   Musculoskeletal: Normal range of motion.   Neurological: He is alert and oriented to person, place, and time. He has a normal Finger-Nose-Finger Test and a normal Heel to Shin Test.   Reflex Scores:       Bicep reflexes are 2+ on the right side and 2+ on the left side.       Brachioradialis reflexes are 2+ on the right side and 2+ on the left side.       Patellar reflexes are 2+ on the right side and 2+ on the left side.  Skin: Skin is warm and dry.   Psychiatric: He has a normal mood and affect. His speech is normal and behavior is normal.   Nursing note and vitals reviewed.      NEUROLOGICAL EXAMINATION:     MENTAL STATUS   Oriented to person, place, and time.   Attention: normal. Concentration: normal.   Speech: speech is normal   Level of consciousness: alert    CRANIAL NERVES     CN II   Visual fields full to confrontation.     CN III, IV, VI   Extraocular motions are normal.     CN V   Facial sensation intact.     CN VII   Facial expression full, symmetric.     CN VIII   Hearing: intact    CN XI   CN XI normal.     CN XII   CN XII normal.     MOTOR EXAM   Muscle bulk: normal  Overall muscle tone: normal    Strength   Right biceps: 5/5  Left biceps: 5/5  Right triceps: 5/5  Left triceps: 5/5  Right iliopsoas: 5/5  Left iliopsoas: 5/5    REFLEXES     Reflexes   Right brachioradialis: 2+  Left brachioradialis: 2+  Right biceps: 2+  Left biceps: 2+  Right patellar: 2+  Left patellar: 2+    SENSORY EXAM   Light touch normal.     GAIT AND COORDINATION      Coordination   Finger to nose coordination: normal  Heel to shin coordination: normal      Significant Labs:   CBC: No results for input(s): WBC, HGB, HCT, PLT in the last 48 hours.  CMP: No results for input(s): GLU, NA, K, CL, CO2, BUN, CREATININE, CALCIUM, MG,  PROT, ALBUMIN, BILITOT, ALKPHOS, AST, ALT, ANIONGAP, EGFRNONAA in the last 48 hours.    Significant Studies: I have reviewed and interpreted all pertinent imaging results/findings within the past 24 hours.

## 2018-07-15 NOTE — PLAN OF CARE
Problem: Patient Care Overview  Goal: Plan of Care Review  Outcome: Ongoing (interventions implemented as appropriate)  Patient continues to get out of bed without calling  staff for assistants, note on the monitor ambulating. Fall precaution reviewed and stressed with patient and spouse, both nodded that they understood the importance and guidelines of unit. Will continue monitor. Patient is AAOx4 , denies pain or discomfort. Stated last bm was today on night shift.

## 2018-07-15 NOTE — PROGRESS NOTES
"POC reviewed with Mr Ayala and SO on transfer of care. RNs reviewed seizure and fall precautions (per pt and SO they were given permission for wife to ambulate with him to ).     2048 During one trip to the restroom (no staff present to visually correlate behaviors) with his wife the event light alerted staff to check on patient who had already returned back to bed on room entry. Per pt he became dizzy and had a sense that he was "out of it" momentarily, but this resolved on returning back to bed. Pt was neuro intact and conversant at this time, only c/o some R facial tingling/twitching that according to him means he just needs his evening meds.    At 2240 his SO approached  to alert of us of a possible event. Per wife pt was pushing buttons on his telemetry monitor when the intent was to press keys on his laptop. Button pressed by staff to timestamp behavior. Pt in light sleep but easily aroused and answered all questions correctly and pointed to/identified all requested items. Mr Ayala explains that he was in and out of sleep at the time and was just groggy.      No other events/issues observed or reported thus far. SO remains at bedside and pt verbalizes intent to inform RNs of any changes.  "

## 2018-07-15 NOTE — SUBJECTIVE & OBJECTIVE
Interval History: Clinical event yesterday evening      Current Facility-Administered Medications   Medication Dose Route Frequency Provider Last Rate Last Dose    acetaminophen tablet 650 mg  650 mg Oral Q4H PRN Xoimara Saldana MD        clonazePAM tablet 0.5 mg  0.5 mg Oral BID Henry Avendaño MD   0.5 mg at 07/15/18 0920    docusate sodium capsule 100 mg  100 mg Oral Daily PRN Xiomara Saldana MD        lamoTRIgine tablet 200 mg  200 mg Oral QHS Henry Avendaño MD   200 mg at 07/14/18 2106    lamoTRIgine tablet 300 mg  300 mg Oral Daily Henry Avendaño MD   300 mg at 07/15/18 0920    levETIRAcetam tablet 1,500 mg  1,500 mg Oral BID Henry Avendaño MD   1,500 mg at 07/15/18 0920    ondansetron disintegrating tablet 8 mg  8 mg Oral Q8H PRN Xiomara Saldana MD        sodium chloride 0.9% flush 3 mL  3 mL Intravenous PRN Xiomara Saldana MD   3 mL at 07/14/18 2107     Review of Systems   Respiratory: Negative for cough and shortness of breath.    Cardiovascular: Negative for chest pain and palpitations.   Gastrointestinal: Negative for constipation, diarrhea, nausea and vomiting.   Genitourinary: Negative for difficulty urinating, dysuria, frequency and urgency.   Musculoskeletal: Negative for arthralgias.   Skin: Negative for wound.   Neurological: Positive for seizures. Negative for weakness and headaches.   Psychiatric/Behavioral: Positive for decreased concentration. The patient is nervous/anxious.      Objective:     Vital Signs (Most Recent):  Temp: 98.1 °F (36.7 °C) (07/15/18 0842)  Pulse: 75 (07/15/18 0842)  Resp: 19 (07/15/18 0842)  BP: 110/66 (07/15/18 0842)  SpO2: (!) 94 % (07/15/18 0842) Vital Signs (24h Range):  Temp:  [97 °F (36.1 °C)-98.5 °F (36.9 °C)] 98.1 °F (36.7 °C)  Pulse:  [] 75  Resp:  [18-20] 19  SpO2:  [93 %-95 %] 94 %  BP: (103-128)/(55-78) 110/66     Weight: 99.8 kg (220 lb)  Body mass index is 29.84 kg/m².    Physical Exam   Constitutional: He is  oriented to person, place, and time. He appears well-developed and well-nourished. No distress.   HENT:   Head: Normocephalic and atraumatic.   Eyes: EOM are normal.   Pulmonary/Chest: Effort normal.   Musculoskeletal: Normal range of motion.   Neurological: He is alert and oriented to person, place, and time. He has a normal Finger-Nose-Finger Test and a normal Heel to Shin Test.   Reflex Scores:       Bicep reflexes are 2+ on the right side and 2+ on the left side.       Brachioradialis reflexes are 2+ on the right side and 2+ on the left side.       Patellar reflexes are 2+ on the right side and 2+ on the left side.  Skin: Skin is warm and dry.   Psychiatric: He has a normal mood and affect. His speech is normal and behavior is normal.   Nursing note and vitals reviewed.      NEUROLOGICAL EXAMINATION:     MENTAL STATUS   Oriented to person, place, and time.   Attention: normal. Concentration: normal.   Speech: speech is normal   Level of consciousness: alert    CRANIAL NERVES     CN II   Visual fields full to confrontation.     CN III, IV, VI   Extraocular motions are normal.     CN V   Facial sensation intact.     CN VII   Facial expression full, symmetric.     CN VIII   Hearing: intact    CN XI   CN XI normal.     CN XII   CN XII normal.     MOTOR EXAM   Muscle bulk: normal  Overall muscle tone: normal    Strength   Right biceps: 5/5  Left biceps: 5/5  Right triceps: 5/5  Left triceps: 5/5  Right iliopsoas: 5/5  Left iliopsoas: 5/5    REFLEXES     Reflexes   Right brachioradialis: 2+  Left brachioradialis: 2+  Right biceps: 2+  Left biceps: 2+  Right patellar: 2+  Left patellar: 2+    SENSORY EXAM   Light touch normal.     GAIT AND COORDINATION      Coordination   Finger to nose coordination: normal  Heel to shin coordination: normal      Significant Labs:   CBC: No results for input(s): WBC, HGB, HCT, PLT in the last 48 hours.  CMP: No results for input(s): GLU, NA, K, CL, CO2, BUN, CREATININE, CALCIUM, MG,  PROT, ALBUMIN, BILITOT, ALKPHOS, AST, ALT, ANIONGAP, EGFRNONAA in the last 48 hours.    Significant Studies: I have reviewed and interpreted all pertinent imaging results/findings within the past 24 hours.

## 2018-07-15 NOTE — PROGRESS NOTES
[facility]  Neurology-Epilepsy  Progress Note    Patient Name: Steven Ayala Jr.  MRN: [id number]  Admission Date: 7/12/2018  Hospital Length of Stay: 3 days  Code Status: Full Code   Attending Provider: CONSTANCE Reilly MD  Primary Care Physician: Primary Doctor No   Principal Problem:<principal problem not specified>    Subjective:     Hospital Course:   7/12-13:  Admitted to EMU  7/13-14:  3 seizures from O2  7/14-15:  1 seizure from O2    Interval History: Clinical events yesterday evening      Current Facility-Administered Medications   Medication Dose Route Frequency Provider Last Rate Last Dose    acetaminophen tablet 650 mg  650 mg Oral Q4H PRN Xiomara Saldana MD        clonazePAM tablet 0.5 mg  0.5 mg Oral BID Henry Avendaño MD   0.5 mg at 07/15/18 0920    docusate sodium capsule 100 mg  100 mg Oral Daily PRN Xiomara Saldana MD        lamoTRIgine tablet 200 mg  200 mg Oral QHS Henry Avendaño MD   200 mg at 07/14/18 2106    lamoTRIgine tablet 300 mg  300 mg Oral Daily Henry Avendaño MD   300 mg at 07/15/18 0920    levETIRAcetam tablet 1,500 mg  1,500 mg Oral BID Henry Avendaño MD   1,500 mg at 07/15/18 0920    ondansetron disintegrating tablet 8 mg  8 mg Oral Q8H PRN Xiomara Saldana MD        sodium chloride 0.9% flush 3 mL  3 mL Intravenous PRN Xiomara Saldana MD   3 mL at 07/14/18 2107     Review of Systems   Respiratory: Negative for cough and shortness of breath.    Cardiovascular: Negative for chest pain and palpitations.   Gastrointestinal: Negative for constipation, diarrhea, nausea and vomiting.   Genitourinary: Negative for difficulty urinating, dysuria, frequency and urgency.   Musculoskeletal: Negative for arthralgias.   Skin: Negative for wound.   Neurological: Positive for seizures. Negative for weakness and headaches.   Psychiatric/Behavioral: Positive for decreased concentration. The patient is nervous/anxious.      Objective:      Vital Signs (Most Recent):  Temp: 98.1 °F (36.7 °C) (07/15/18 0842)  Pulse: 75 (07/15/18 0842)  Resp: 19 (07/15/18 0842)  BP: 110/66 (07/15/18 0842)  SpO2: (!) 94 % (07/15/18 0842) Vital Signs (24h Range):  Temp:  [97 °F (36.1 °C)-98.5 °F (36.9 °C)] 98.1 °F (36.7 °C)  Pulse:  [] 75  Resp:  [18-20] 19  SpO2:  [93 %-95 %] 94 %  BP: (103-128)/(55-78) 110/66     Weight: 99.8 kg (220 lb)  Body mass index is 29.84 kg/m².    Physical Exam   Constitutional: He is oriented to person, place, and time. He appears well-developed and well-nourished. No distress.   HENT:   Head: Normocephalic and atraumatic.   Eyes: EOM are normal.   Pulmonary/Chest: Effort normal.   Musculoskeletal: Normal range of motion.   Neurological: He is alert and oriented to person, place, and time. He has a normal Finger-Nose-Finger Test and a normal Heel to Shin Test.   Reflex Scores:       Bicep reflexes are 2+ on the right side and 2+ on the left side.       Brachioradialis reflexes are 2+ on the right side and 2+ on the left side.       Patellar reflexes are 2+ on the right side and 2+ on the left side.  Skin: Skin is warm and dry.   Psychiatric: He has a normal mood and affect. His speech is normal and behavior is normal.   Nursing note and vitals reviewed.      NEUROLOGICAL EXAMINATION:     MENTAL STATUS   Oriented to person, place, and time.   Attention: normal. Concentration: normal.   Speech: speech is normal   Level of consciousness: alert    CRANIAL NERVES     CN II   Visual fields full to confrontation.     CN III, IV, VI   Extraocular motions are normal.     CN V   Facial sensation intact.     CN VII   Facial expression full, symmetric.     CN VIII   Hearing: intact    CN XI   CN XI normal.     CN XII   CN XII normal.     MOTOR EXAM   Muscle bulk: normal  Overall muscle tone: normal    Strength   Right biceps: 5/5  Left biceps: 5/5  Right triceps: 5/5  Left triceps: 5/5  Right iliopsoas: 5/5  Left iliopsoas: 5/5    REFLEXES      Reflexes   Right brachioradialis: 2+  Left brachioradialis: 2+  Right biceps: 2+  Left biceps: 2+  Right patellar: 2+  Left patellar: 2+    SENSORY EXAM   Light touch normal.     GAIT AND COORDINATION      Coordination   Finger to nose coordination: normal  Heel to shin coordination: normal      Significant Labs:   CBC: No results for input(s): WBC, HGB, HCT, PLT in the last 48 hours.  CMP: No results for input(s): GLU, NA, K, CL, CO2, BUN, CREATININE, CALCIUM, MG, PROT, ALBUMIN, BILITOT, ALKPHOS, AST, ALT, ANIONGAP, EGFRNONAA in the last 48 hours.    Significant Studies: I have reviewed and interpreted all pertinent imaging results/findings within the past 24 hours.    Assessment and Plan:     Complex partial epilepsy with generalization and with intractable epilepsy    Per history.  Patient reports prior seizure workup with intracranial lead placement in [place] when he was a teenager.      vEEG  PS, HV    Start meds at home dose tonight.            VTE Risk Mitigation         Ordered     IP VTE LOW RISK PATIENT  Once      07/12/18 1801          Henry Avendaño MD  Neurology-Epilepsy  [facility]

## 2018-07-16 LAB
LAMOTRIGINE SERPL-MCNC: 6 UG/ML (ref 2–15)
LEVETIRACETAM SERPL-MCNC: 7.8 UG/ML (ref 3–60)

## 2018-07-16 NOTE — PLAN OF CARE
07/16/18 0750   Final Note   Assessment Type Final Discharge Note   Discharge Disposition Home     Patient was discharged to home with no discharge needs. Patient's family provided transportation home.

## 2018-07-24 ENCOUNTER — TELEPHONE (OUTPATIENT)
Dept: NEUROLOGY | Facility: CLINIC | Age: 33
End: 2018-07-24

## 2018-07-24 DIAGNOSIS — G40.411 OTHER GENERALIZED EPILEPSY, INTRACTABLE, WITH STATUS EPILEPTICUS: ICD-10-CM

## 2018-07-24 DIAGNOSIS — R56.9 SEIZURES, TRANSIENT: Primary | ICD-10-CM

## 2018-07-25 ENCOUNTER — TELEPHONE (OUTPATIENT)
Dept: NEUROLOGY | Facility: CLINIC | Age: 33
End: 2018-07-25

## 2018-07-31 ENCOUNTER — CLINICAL SUPPORT (OUTPATIENT)
Dept: OPHTHALMOLOGY | Facility: CLINIC | Age: 33
End: 2018-07-31
Attending: PSYCHIATRY & NEUROLOGY
Payer: COMMERCIAL

## 2018-07-31 ENCOUNTER — HOSPITAL ENCOUNTER (OUTPATIENT)
Dept: RADIOLOGY | Facility: HOSPITAL | Age: 33
Discharge: HOME | End: 2018-07-31
Attending: PSYCHIATRY & NEUROLOGY
Payer: COMMERCIAL

## 2018-07-31 DIAGNOSIS — G40.411 OTHER GENERALIZED EPILEPSY, INTRACTABLE, WITH STATUS EPILEPTICUS: ICD-10-CM

## 2018-07-31 DIAGNOSIS — R56.9 SEIZURES: ICD-10-CM

## 2018-07-31 PROCEDURE — 78608 BRAIN IMAGING (PET): CPT | Mod: TC

## 2018-07-31 PROCEDURE — 78608 BRAIN IMAGING (PET): CPT | Mod: 26,PI,, | Performed by: RADIOLOGY

## 2018-07-31 NOTE — PROCEDURES
EPILEPSY MONITORING UNIT  EEG/VIDEO TELEMETRY REPORT  DATE OF SERVICE: 7/13-15/2018  EEG NUMBER: EMU -3,4,5  REQUESTED BY:   LOCATION OF SERVICE:     METHODOLOGY      Electroencephalographic (EEG) is recorded with electrodes placed according to the International 10-20 placement system.  Thirty Two (32) channels of digital signal including the T1 and T2 electrodes are simultaneously recorded from the scalp and also including EKG, EMG  and/or eye movement monitors.  Recording band pass was 0.1 to 512 hz.  Digital video recording of the patient is simultaneously recorded with the EEG.  The patient is instructed report clinical symptoms which may occur during the recording session.  EEG and video recording is stored and archived in digital format. Activation procedures which include photic stimulation, hyperventilation and instructing patients to perform simple task are done in selected patients.       The EEG is displayed on a monitor screen and can be reformatted into different montages for evaluation.  The entire recoding is submitted for computer assisted analysis to detect spike and electrographic seizure activity.  The entire recording is visually reviewed and the times identified by computer analysis as being spikes or seizures are reviewed again.  Compresses spectral analysis (CSA) is also performed on the activity recorded from each individual channel.  This is displayed as a power display of frequencies from 0 to 30 Hz over time.   The CSA analysis is done and displayed continuously.  This is reviewed for asymmetries in power between homologous areas of the scalp and for presence of changes in power which can be seen when seizures occur.  Sections of suspected abnormalities on the CSA is then compared with the original EEG recording.      [employer] software was also utilized in the review of this study.  This software suite analyzes the EEG recording in multiple domains.  Coherence and rhythmicity is  computed to identify EEG sections which may contain organized seizures.  Each channel undergoes analysis to detect presence of spike and sharp waves which have special and morphological characteristic of epileptic activity.  The routine EEG recording is converted from spacial into frequency domain.  This is then displayed comparing homologous areas to identify areas of significant asymmetry.  Algorithm to identify non-cortically generated artifact is used to separate eye movement, EMG and other artifact from the EEG.      Recording Times  Start on 7/13/2018 at 07:00:41 stop on 7/14/2018 at 07:00:03  Start on 7/14/2018 at 07:00:45 stop on 7/15/2018 at 07:00:00  Start on 7/15/2018 at 07:00:39 stop on 7/15/2018 at 10:24:40    A total of 61:04:30 hours of EEG/Video telemetry was recorded.    Events/Seizures recorded  Seizure 1 - on 7/13/2018 start at 13:09:07 stopped 13:09:15  Seizure 2 - on 7/13/2018 start at 18:16:21 stopped 18:16:41  Seizure 3 - on 7/13/2018 start at 19:33:17 stopped 19:33:30  Seizure 4 - on 7/14/2018 start at 04:32:42 stopped 04:33:55  Seizure 5 - on 7/14/2018 start at 20:26:35 stopped 20:27:19    ELECTROENCEPHALOGRAM  INTERICTAL:  Background activity:   The background rhythm was characterized by alpha and anterior dominant beta activity with a 10-11Hz posterior dominant alpha rhythm at 30-70 microvolts.     Symmetry and continuity: there is intermittent right occipital slowing similar in appearance to the patients seizures although without clinical correlate.     Sleep:   Normal sleep transients including sleep spindles, K complexes, vertex waves and POSTS were seen.    Activation procedures:   Hyperventilation was performed with no abnormalities seen  Photic stimulation was performed with no abnormalities seen    Abnormal activity:   No epileptiform discharges, periodic discharges, lateralized rhythmic delta activity or electrographic seizures were seen.    ICTAL:  In all events, there is sudden  onset of quasi-rhythmic, sharply-contoured delta activity with superimposed alpha maximal at O2.    CLINICAL DESCRIPTION OF EVENTS/SEIZURES:  Seizure 1, 5:  off camera in the bathroom, experienced subjective change in sensation  Seizure 2-3:  subjective change in sensation, no change on video  Seizure 4:   Patient arouses from sleep on his left side, turns onto his stomach, and turns head left although this is not clearly forced.  He appears confused and tells wife he is having an event.    FINAL SUMMARY  This abnormal two day video EEG is the completion of the portion of the study reported by Dr. Reilly.  This portion of the study recorded five of the patients typical seizures of feeling odd or discombobulated.  Patient was not able to be tested during the events but there was no definite objective behavioral arrest or release or any other clinical change during them.  All seizures were associated with onset of focal electrographic changes in the right occipital region.  There was also indication of focal cortical dysfunction in that region supporting a diagnosis of simple partial seizures of the right occipital lobe.     CLINICAL SEIZURE/EVENT   Classification:  Epileptic   Localization:  Occipital-parietal   Lateralization:  Right    Henry Avendaño M.D.

## 2018-08-01 LAB — POCT GLUCOSE: 95 MG/DL (ref 70–110)

## 2018-08-17 NOTE — PROCEDURES
DATE OF STUDY:  07/12/2018-07/13/2018    EEG NUMBERS:  EMU--1 and EMU--2.    EPILEPSY MONITORING UNIT  EEG AND VIDEO TELEMETRY REPORT    METHODOLOGY:  Electroencephalographic (EEG) is recorded with electrodes placed   according to the International 10-20 placement system.  Thirty Two (32) channels   of digital signal, including T1 and T2 electrodes, are simultaneously recorded   from the scalp and may also include EKG, EMG and/or eye movement monitors.    Recording band pass was 0.1 to 512 Hz.  Digital video recording of the patient   is simultaneously recorded with the EEG.  The patient is instructed to report   clinical symptoms which may occur during the recording session.  EEG and video   recording are stored and archived in digital format.  Activation procedures,   which include photic stimulation, hyperventilation and instructing patients to   perform simple tasks, are done in selected patients.    The EEG is displayed on a monitor screen and can be reformatted into different   montages for evaluation.  The entire recoding is submitted for computer-assisted   analysis to detect spike and electrographic seizure activity.  The entire   recording is visually reviewed, and the times identified by computer analysis as   being spikes or seizures are reviewed again.    Compressed spectral analysis (CSA) is also performed on the activity recorded   from each individual channel.  This is displayed as a power display of   frequencies from 0 to 30 Hz over time.  The CSA analysis is done and displayed   continuously.  This is reviewed for asymmetries in power between homologous   areas of the scalp and for presence of changes in power which can be seen when   seizures occur.  Sections of suspected abnormalities on the CSA are then   compared with the original EEG recording.    [employer] software was also utilized in the review of this study.  This software   suite analyzes the EEG recording in multiple domains.   Coherence and rhythmicity   are computed to identify EEG sections which may contain organized seizures.    Each channel undergoes analysis to detect presence of spike and sharp waves   which have special and morphological characteristics of epileptic activity.  The   routine EEG recording is converted from special into frequency domain.  This is   then displayed comparing homologous areas to identify areas of significant   asymmetry.  Algorithm to identify non-cortically generated artifact is used to   separate artifact from the EEG.    RECORDING TIMES:  Start on 07/12/2018, at 17:09:11.  Stop on 07/12/2018, at 17:27:18.  Start on 07/12/2018, at 17:41:04.  Stop on 07/13/2018, at 07:00:01.  A total of 13 hours, 31 minutes and 14 seconds of video/EEG telemetry was   recorded.    EEG FINDINGS:  The patient's background consists of a posteriorly dominant alpha   rhythm with a frequency of approximately 10 Hz.  This is well formed, well   modulated and abolishes with eye opening.  Anteriorly, the patient's background   consists mainly of beta range frequencies.  It does bear mentioning that at   various times during the recording, some right-sided slowing was appreciated,   most notably in the right posterior quadrant.  There were no focal lateralized   or epileptiform transients.  No electrographic seizures are seen.  At various   times during the study, the patient is noted to be in the awake, drowsy and   asleep states with features of stage N2 sleep architecture being observed.    Provocative maneuvers including hyperventilation and photic stimulation are not   noted on the record as having been performed.    EKG demonstrates sinus rhythm.    INTERPRETATION:  This is an abnormal long-term video EEG monitoring study due to   focal slowing seen in the right posterior quadrant.  This indicates the   presence of focal cortical dysfunction though its clinical significance is   unclear beyond this.  None of the patient's  clinical events of interest were   captured so the study was subsequently continued.  Please see Dr. Avendaño's   dictation for a summary of the findings, which occurred subsequent to this   portion of the recording.      TG/IN  dd: 08/17/2018 14:25:43 (CDT)  td: 08/17/2018 15:05:13 (CDT)  Doc ID   #[id number]  Job ID #[id number]    CC:

## 2018-09-05 ENCOUNTER — OFFICE VISIT (OUTPATIENT)
Dept: NEUROLOGY | Facility: CLINIC | Age: 33
End: 2018-09-05
Payer: COMMERCIAL

## 2018-09-05 VITALS
WEIGHT: 224.63 LBS | SYSTOLIC BLOOD PRESSURE: 122 MMHG | DIASTOLIC BLOOD PRESSURE: 78 MMHG | HEIGHT: 71 IN | BODY MASS INDEX: 31.45 KG/M2 | HEART RATE: 77 BPM

## 2018-09-05 DIAGNOSIS — G40.109 PARTIAL SYMPTOMATIC EPILEPSY WITH SIMPLE PARTIAL SEIZURES, NOT INTRACTABLE, WITHOUT STATUS EPILEPTICUS: Primary | ICD-10-CM

## 2018-09-05 PROCEDURE — 99214 OFFICE O/P EST MOD 30 MIN: CPT | Mod: S$GLB,,, | Performed by: PSYCHIATRY & NEUROLOGY

## 2018-09-05 PROCEDURE — 99999 PR PBB SHADOW E&M-EST. PATIENT-LVL III: CPT | Mod: PBBFAC,,, | Performed by: PSYCHIATRY & NEUROLOGY

## 2018-09-05 RX ORDER — CLONAZEPAM 0.5 MG/1
0.5 TABLET ORAL 2 TIMES DAILY
Qty: 60 TABLET | Refills: 5 | Status: SHIPPED | OUTPATIENT
Start: 2018-09-05 | End: 2019-01-23

## 2018-09-05 RX ORDER — LAMOTRIGINE 150 MG/1
300 TABLET ORAL DAILY
Qty: 180 TABLET | Refills: 1 | Status: SHIPPED | OUTPATIENT
Start: 2018-09-05 | End: 2019-12-30 | Stop reason: SDUPTHER

## 2018-09-05 NOTE — LETTER
September 5, 2018    Steven Ayala Jr.  [address] [address] [address]             [facility]  [address]  [place] [address]  Phone: [phone]  Fax: [phone] To whom it may concern,    I saw Mr. Steven yAala in neurology clinic today for ongoing care of his epilepsy.  He has not had any seizure causing impairment of consciousness since April 19, 2017 when he had a generalized convulsion in the setting of medication changes.  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      Thank you,          Henry Avendaño M.D.

## 2018-09-06 NOTE — PROGRESS NOTES
Name: Steven Ayala Jr.  MRN: [id number]   CSN: [id number]      Date: 09/06/2018    HISTORY OF PRESENT ILLNESS (HPI)  The patient is a 32 y.o.   The patient was initially referred for consultation by Dr Darrian Copeland in .  The patient was accompanied today by family members who provided some of the history.      FROM Dr Copeland last visit:  No seizure . He is here for paper works. He is telling that when he was very young , he was told that he has scar tissue in the brain. He is here to go over the MRI of the brain. He has some seizure like activities frequently despite he takes medication on time.    INTERVAL HX:  SPS briefly resolved following increase in LEV but have recurred, tolerating medications          Past Medical History:   Diagnosis Date    Seizures        History reviewed. No pertinent surgical history.  History reviewed. No pertinent family history.       Social History   Substance Use Topics    Smoking status: Current Every Day Smoker    Smokeless tobacco: None    Alcohol use Yes      Review of patient's allergies indicates:  No Known Allergies   There is no problem list on file for this patient.     Review of Systems   Constitutional: Negative for fever and weight loss.   HENT: Negative for ear pain, hearing loss and tinnitus.    Eyes: Negative for blurred vision, double vision, photophobia, pain, discharge and redness.   Respiratory: Negative for cough and shortness of breath.    Cardiovascular: Negative for chest pain, palpitations, claudication and leg swelling.   Gastrointestinal: Negative for abdominal pain, heartburn, nausea and vomiting.   Genitourinary: Negative for dysuria, flank pain, frequency and urgency.   Musculoskeletal: Negative for back pain, falls, joint pain, myalgias and neck pain.   Skin: Negative for itching and rash.   Neurological: Positive for seizures. Negative for dizziness, tingling, tremors, sensory change, speech change, focal weakness, loss of consciousness, weakness and  headaches.   Endo/Heme/Allergies: Does not bruise/bleed easily.   Psychiatric/Behavioral: Negative for depression, hallucinations, memory loss and suicidal ideas. The patient is nervous/anxious. The patient does not have insomnia.                OBJECTIVE:      Physical Exam   Constitutional: He is oriented to person, place, and time. He appears well-developed and well-nourished. No distress.   HENT:   Head: Normocephalic.   Right Ear: External ear normal.   Left Ear: External ear normal.   Mouth/Throat: Oropharynx is clear and moist.   Eyes: Conjunctivae and EOM are normal. Pupils are equal, round, and reactive to light.   Neck: Normal range of motion. Neck supple. No tracheal deviation present. No thyromegaly present.   Cardiovascular: Regular rhythm, normal heart sounds and intact distal pulses.    Pulmonary/Chest: Effort normal and breath sounds normal. No respiratory distress.   Abdominal: Soft. Bowel sounds are normal. There is no tenderness.   Musculoskeletal: He exhibits no edema or tenderness.   Lymphadenopathy:     He has no cervical adenopathy.   Neurological: He is alert and oriented to person, place, and time. He has normal strength and normal reflexes. He displays no tremor and normal reflexes. No cranial nerve deficit or sensory deficit. He exhibits normal muscle tone. Coordination normal. He displays no Babinski's sign on the right side. He displays no Babinski's sign on the left side.   Reflex Scores:       Tricep reflexes are 2+ on the right side and 2+ on the left side.       Bicep reflexes are 2+ on the right side and 2+ on the left side.       Brachioradialis reflexes are 2+ on the right side and 2+ on the left side.       Patellar reflexes are 2+ on the right side and 2+ on the left side.       Achilles reflexes are 2+ on the right side and 2+ on the left side.  Skin: Skin is warm and dry. No rash noted. He is not diaphoretic. No erythema. No pallor.   Psychiatric: He has a normal mood and  affect. His behavior is normal. Judgment and thought content normal.         Strength   Deltoids Triceps Biceps Wrist Extension Wrist Flexion Hand    Upper: R 5/5 5/5 5/5 5/5 5/5 5/5     L 5/5 5/5 5/5 5/5 5/5 5/5       Iliopsoas Quadriceps Knee  Flexion Tibialis  anterior Gastro- cnemius EHL   Lower: R 5/5 5/5 5/5 5/5 5/5 5/5     L 5/5 5/5 5/5 5/5 5/5 5/5      MRI of the brain: 3/19/2018     Impression          No definite acute process.    Several nonspecific 2 mm subcortical white matter hyperintensity signal foci.    Small zone of increased T2 signal right occipital cortex which may represent gliosis or post traumatic encephalomalacia.  Please correlate with clinical history.    Otherwise negative.        Seizure Triggers  Sleep Deprivation - None  Other medications - None  Psych/stress - None  Photic stimulation - None  Hyperventilation - None  Medical Problems - None  Menses - No  Sensory Stimulation (light, sound, etc) - None  Missed dose of meds - None    AED Treatments  Present regimen  LTG 300mg AM / 200mg PM  LEV 1500mg BID  Klonopin 0.5mg BID    Prior treatments  Unclear        Not tried  acetazolamide (Diamox, AZM)  amantadine  carbamazepine (Tegretol, CBZ)  clobezam (Onfi or Frizium, CLB)  ethosuximide (Zarontin, ESM)  eslicarbazine (Aptiom, ESL)  felbamate (felbatol, FBM)  gabapentin (Neurontin, GBP)  lacosamide (Vimpat, LCM)   lamotrigine (Lamictal, LTG)   levetiracetam (Keppra, LEV)  methsuximide (Celontin, MSM)  methyphenytoin (Mesantion, MHT)  oxcarbazepine (Trileptal OXC)  perampanel (Fycompa, FCP)   phenobarbital (Pb)  phenytoin (Dilantin, PHT)  pregabalin (Lyrica, PGB)  primidone (Mysoline, PRM)  retigabine (Potiga, RTG)  rufinamide (Banzel, RUF)  tiagabine (Gabatril,  TGB)  topiramate (Topamax, TPM)  viagabatrin, (Sabril, VGB)  vagal nerve stimulator (VNS)  valproic acid (Depakote, VPA)  zonisamide (Zonegran, ZNS)  Benzodiazepines  diazepam - rectal (Diastatl)  diazepam - oral (Valium,  DZ)  clonazepam (Klonopin, CZP)  clorazepate (Tranxene, CLZ)  Ativan  Brain Stimulation  Vagal Nerve Stimulation-n/a  DBS- n/a    Compliance method  Memory - yes  Mom or Spouse - Yes  Pill Box - no  Pankaj calendar - no  Turn over medication bottle - no  Phone alarm - no    Seizure Evaluation  EEG Routine -   EEG Ambulatory -   EEG\Video Monitoring -   MRI/MRA -   CT/CTA Scan -   PET Scan -   Neuropsychological evaluation -   DEXA Scan    Potential Epilepsy Risk Factors:   Pregnancy/Labor/Delivery - full term uncomplicated pregnancy labor and vaginal delivery  Febrile seizures - none  Head injury  - none  CNS infection - none     Stroke - none  Family Hx of Sz - none    PAST MEDICAL HISTORY:   Active Ambulatory Problems     Diagnosis Date Noted    Complex partial epilepsy with generalization and with intractable epilepsy      Resolved Ambulatory Problems     Diagnosis Date Noted    No Resolved Ambulatory Problems     Past Medical History:   Diagnosis Date    Seizures         PAST SURGICAL HISTORY: No past surgical history on file.     FAMILY HISTORY: No family history on file.      SOCIAL HISTORY:   Social History     Socioeconomic History    Marital status:      Spouse name: Not on file    Number of children: Not on file    Years of education: Not on file    Highest education level: Not on file   Social Needs    Financial resource strain: Not on file    Food insecurity - worry: Not on file    Food insecurity - inability: Not on file    Transportation needs - medical: Not on file    Transportation needs - non-medical: Not on file   Occupational History    Not on file   Tobacco Use    Smoking status: Current Every Day Smoker   Substance and Sexual Activity    Alcohol use: Yes    Drug use: Not on file    Sexual activity: Not on file   Other Topics Concern    Not on file   Social History Narrative    Not on file        SUBSTANCE USE:  Social History     Tobacco Use    Smoking status: Current  "Every Day Smoker   Substance and Sexual Activity    Alcohol use: Yes    Drug use: Not on file    Sexual activity: Not on file      Social History     Tobacco Use    Smoking status: Current Every Day Smoker   Substance Use Topics    Alcohol use: Yes        ALLERGIES: Patient has no known allergies.       Review of Systems   Constitutional: Negative for chills, diaphoresis, fever, malaise/fatigue and weight loss.   HENT: Negative for ear pain, hearing loss, nosebleeds and tinnitus.    Eyes: Negative for blurred vision, double vision, photophobia and pain.   Respiratory: Negative for cough, hemoptysis and shortness of breath.    Cardiovascular: Negative for chest pain, palpitations, orthopnea and leg swelling.   Gastrointestinal: Negative for abdominal pain, blood in stool, constipation, diarrhea, heartburn, nausea and vomiting.   Genitourinary: Negative for dysuria and hematuria.   Musculoskeletal: Negative for falls, joint pain and myalgias.   Skin: Negative for itching and rash.   Neurological: Negative for dizziness, tingling, tremors, sensory change, speech change, focal weakness, loss of consciousness, weakness and headaches.   Endo/Heme/Allergies: Negative for environmental allergies. Does not bruise/bleed easily.   Psychiatric/Behavioral: Negative for depression, hallucinations, memory loss and substance abuse. The patient is not nervous/anxious and does not have insomnia.          PHYSICAL EXAM:    /78   Pulse 77   Ht 5' 11" (1.803 m)   Wt 101.9 kg (224 lb 10.4 oz)   BMI 31.33 kg/m²       Neurologic Exam      Higher Cortical Function:    Patient is a well developed, pleasant, well groomed individual appearing their stated age  Oriented - intact to person, place and time    Fund of knowledge was appropriate.    Language - Speech was fluent without evidence for an aphasia.  Cranial Nerves II - XII:    EOMs were intact with normal smooth and no nystagmus.    PERRLA.   Motor - facial movement was " symmetrical and normal.    Facial sensory - Light touch and pin prick sensations were normal.    Hearing was normal.  Palate moved well and was symmetrical    Tongue movement was full & the patient could speak without difficulty.  Motor: Power, bulk and tone were normal in all extremities.  Coordination:  Normal  Gait:  Normal  Tremor: resting, postural, intentional - none  Cardiovascular:  No edema  Skin: No rash         IMPRESSION  1. 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  EEG, MRI, and PET abnormalities all localize.  2. Prior intracranial monitoring as a child of 14-15 y/o in [place]. Then deemed not a good surgical candidate due to risk to eloquent cortex per family.    DISPOSITION:   Increase LTG to 300mg bid, continue LEV 1500mg bid, discussed seizure journal  2. Continue CLZ 0.5mg bid - would be reluctant to wean as he had GTC when this was last attempted  4. Seizure precautions.  5. State driving laws explained to patient.    2014 EPILEPSY QUALITY MEASUREMENT (AAN)  1 a. Seizure Frequency: noted  1b. Seizure Intervention: yes  2.                   a. Etiology: unknown  b. Seizure Type: CPS w sec GTC sz  c. Epilepsy Syndrome: n/a  3.                   a. Side-effects of AED: no                        b. Intervention for side-effects: n/a  4. Screening for psychiatric or behavioral disorders: yes  5. Personalized epilepsy safety issues and education: yes  6. Counseling for women of child-bearing potential and epilepsy: n/a  7. Referral of treatment-resistant epilepsy to comprehensive epilepsy center (q 2 years): N/A.     The patient was asked to call me/the clinic 1 week after the test(s) are done to obtain results.  The following issues were  all discussed in detail with the patient and family/caregiver(s):     1.  Diagnosis, plans, prognosis, medications and possible side-effects, risks and benefits of treatment, other alternatives to AEDs.  2. Risks related to continued seizures, status epilepticus, SUDEP, driving restrictions and seizure precautions ( no baths but showers are ok, no swimming unsupervised, no use of heavy machinery, no use of sharp moving objects, avoid heights).   3. Issues related to pregnancy, OCP and breast feeding as it relates to epilepsy.  4. The potential of teratogenicity and suicidal risks of anti-epileptic medications.  5.Avoid any activity that compromise patient safety related to seizures.      Questions and concerns raised by the patient and family/care-giver(s) were addressed and they indicated understanding of everything discussed and agreed to plans as above.

## 2018-11-01 ENCOUNTER — TELEPHONE (OUTPATIENT)
Dept: NEUROLOGY | Facility: CLINIC | Age: 33
End: 2018-11-01

## 2018-12-27 ENCOUNTER — OFFICE VISIT (OUTPATIENT)
Dept: NEUROLOGY | Facility: CLINIC | Age: 33
End: 2018-12-27
Payer: COMMERCIAL

## 2018-12-27 VITALS
HEIGHT: 71 IN | DIASTOLIC BLOOD PRESSURE: 70 MMHG | BODY MASS INDEX: 31.26 KG/M2 | WEIGHT: 223.31 LBS | SYSTOLIC BLOOD PRESSURE: 131 MMHG | HEART RATE: 70 BPM

## 2018-12-27 DIAGNOSIS — G40.219 COMPLEX PARTIAL EPILEPSY WITH GENERALIZATION AND WITH INTRACTABLE EPILEPSY: Primary | ICD-10-CM

## 2018-12-27 PROCEDURE — 99214 OFFICE O/P EST MOD 30 MIN: CPT | Mod: S$GLB,,, | Performed by: PSYCHIATRY & NEUROLOGY

## 2018-12-27 PROCEDURE — 99999 PR PBB SHADOW E&M-EST. PATIENT-LVL IV: CPT | Mod: PBBFAC,,, | Performed by: PSYCHIATRY & NEUROLOGY

## 2018-12-27 RX ORDER — LEVETIRACETAM 1000 MG/1
2000 TABLET ORAL 2 TIMES DAILY
Qty: 360 TABLET | Refills: 1 | Status: SHIPPED | OUTPATIENT
Start: 2018-12-27 | End: 2019-07-26 | Stop reason: SDUPTHER

## 2018-12-27 NOTE — PROGRESS NOTES
Name: Steven Ayala Jr.  MRN: [id number]   CSN: [id number]      Date: 12/27/2018    HISTORY OF PRESENT ILLNESS (HPI)  The patient is a 33 y.o.  initially referred for consultation by Dr Darrian Copeland in .  The patient was accompanied today by family members who provided some of the history.      FROM Dr Copeland last visit:  No seizure . He is here for paper works. He is telling that when he was very young , he was told that he has scar tissue in the brain. He is here to go over the MRI of the brain. He has some seizure like activities frequently despite he takes medication on time.    INTERVAL HX:  SPS decreased from 1-2 daily to 1-2/week with LTG increase, tolerating well    9/2018  SPS briefly resolved following increase in LEV but have recurred, tolerating medications          Past Medical History:   Diagnosis Date    Seizures        History reviewed. No pertinent surgical history.  History reviewed. No pertinent family history.       Social History   Substance Use Topics    Smoking status: Current Every Day Smoker    Smokeless tobacco: None    Alcohol use Yes      Review of patient's allergies indicates:  No Known Allergies   There is no problem list on file for this patient.     Review of Systems   Constitutional: Negative for fever and weight loss.   HENT: Negative for ear pain, hearing loss and tinnitus.    Eyes: Negative for blurred vision, double vision, photophobia, pain, discharge and redness.   Respiratory: Negative for cough and shortness of breath.    Cardiovascular: Negative for chest pain, palpitations, claudication and leg swelling.   Gastrointestinal: Negative for abdominal pain, heartburn, nausea and vomiting.   Genitourinary: Negative for dysuria, flank pain, frequency and urgency.   Musculoskeletal: Negative for back pain, falls, joint pain, myalgias and neck pain.   Skin: Negative for itching and rash.   Neurological: Positive for seizures. Negative for dizziness, tingling, tremors, sensory  change, speech change, focal weakness, loss of consciousness, weakness and headaches.   Endo/Heme/Allergies: Does not bruise/bleed easily.   Psychiatric/Behavioral: Negative for depression, hallucinations, memory loss and suicidal ideas. The patient is nervous/anxious. The patient does not have insomnia.          OBJECTIVE:      Physical Exam   Constitutional: He is oriented to person, place, and time. He appears well-developed and well-nourished. No distress.   HENT:   Head: Normocephalic.   Right Ear: External ear normal.   Left Ear: External ear normal.   Mouth/Throat: Oropharynx is clear and moist.   Eyes: Conjunctivae and EOM are normal. Pupils are equal, round, and reactive to light.   Neck: Normal range of motion. Neck supple. No tracheal deviation present. No thyromegaly present.   Cardiovascular: Regular rhythm, normal heart sounds and intact distal pulses.    Pulmonary/Chest: Effort normal and breath sounds normal. No respiratory distress.   Abdominal: Soft. Bowel sounds are normal. There is no tenderness.   Musculoskeletal: He exhibits no edema or tenderness.   Lymphadenopathy:     He has no cervical adenopathy.   Neurological: He is alert and oriented to person, place, and time. He has normal strength and normal reflexes. He displays no tremor and normal reflexes. No cranial nerve deficit or sensory deficit. He exhibits normal muscle tone. Coordination normal. He displays no Babinski's sign on the right side. He displays no Babinski's sign on the left side.   Reflex Scores:       Tricep reflexes are 2+ on the right side and 2+ on the left side.       Bicep reflexes are 2+ on the right side and 2+ on the left side.       Brachioradialis reflexes are 2+ on the right side and 2+ on the left side.       Patellar reflexes are 2+ on the right side and 2+ on the left side.       Achilles reflexes are 2+ on the right side and 2+ on the left side.  Skin: Skin is warm and dry. No rash noted. He is not diaphoretic.  No erythema. No pallor.   Psychiatric: He has a normal mood and affect. His behavior is normal. Judgment and thought content normal.         Strength   Deltoids Triceps Biceps Wrist Extension Wrist Flexion Hand    Upper: R 5/5 5/5 5/5 5/5 5/5 5/5     L 5/5 5/5 5/5 5/5 5/5 5/5       Iliopsoas Quadriceps Knee  Flexion Tibialis  anterior Gastro- cnemius EHL   Lower: R 5/5 5/5 5/5 5/5 5/5 5/5     L 5/5 5/5 5/5 5/5 5/5 5/5      MRI of the brain: 3/19/2018     Impression          No definite acute process.    Several nonspecific 2 mm subcortical white matter hyperintensity signal foci.    Small zone of increased T2 signal right occipital cortex which may represent gliosis or post traumatic encephalomalacia.  Please correlate with clinical history.    Otherwise negative.        Seizure Triggers  Sleep Deprivation - None  Other medications - None  Psych/stress - None  Photic stimulation - None  Hyperventilation - None  Medical Problems - None  Menses - No  Sensory Stimulation (light, sound, etc) - None  Missed dose of meds - None    AED Treatments  Present regimen  LTG 300mg AM / 300mg PM  LEV 1500mg BID  Klonopin 0.5mg BID    Prior treatments  Unclear    Not tried  acetazolamide (Diamox, AZM)  amantadine  carbamazepine (Tegretol, CBZ)  clobezam (Onfi or Frizium, CLB)  ethosuximide (Zarontin, ESM)  eslicarbazine (Aptiom, ESL)  felbamate (felbatol, FBM)  gabapentin (Neurontin, GBP)  lacosamide (Vimpat, LCM)   lamotrigine (Lamictal, LTG)   levetiracetam (Keppra, LEV)  methsuximide (Celontin, MSM)  methyphenytoin (Mesantion, MHT)  oxcarbazepine (Trileptal OXC)  perampanel (Fycompa, FCP)   phenobarbital (Pb)  phenytoin (Dilantin, PHT)  pregabalin (Lyrica, PGB)  primidone (Mysoline, PRM)  retigabine (Potiga, RTG)  rufinamide (Banzel, RUF)  tiagabine (Gabatril,  TGB)  topiramate (Topamax, TPM)  viagabatrin, (Sabril, VGB)  vagal nerve stimulator (VNS)  valproic acid (Depakote, VPA)  zonisamide (Zonegran,  ZNS)  Benzodiazepines  diazepam - rectal (Diastatl)  diazepam - oral (Valium, DZ)  clonazepam (Klonopin, CZP)  clorazepate (Tranxene, CLZ)  Ativan  Brain Stimulation  Vagal Nerve Stimulation-n/a  DBS- n/a    Compliance method  Memory - yes  Mom or Spouse - Yes  Pill Box - no  Pankaj calendar - no  Turn over medication bottle - no  Phone alarm - no    Seizure Evaluation  EEG Routine -   EEG Ambulatory -   EEG\Video Monitoring -   MRI/MRA -   CT/CTA Scan -   PET Scan -   Neuropsychological evaluation -   DEXA Scan    Potential Epilepsy Risk Factors:   Pregnancy/Labor/Delivery - full term uncomplicated pregnancy labor and vaginal delivery  Febrile seizures - none  Head injury  - none  CNS infection - none     Stroke - none  Family Hx of Sz - none    PAST MEDICAL HISTORY:   Active Ambulatory Problems     Diagnosis Date Noted    Complex partial epilepsy with generalization and with intractable epilepsy      Resolved Ambulatory Problems     Diagnosis Date Noted    No Resolved Ambulatory Problems     Past Medical History:   Diagnosis Date    Seizures         PAST SURGICAL HISTORY: No past surgical history on file.     FAMILY HISTORY: No family history on file.      SOCIAL HISTORY:   Social History     Socioeconomic History    Marital status:      Spouse name: Not on file    Number of children: Not on file    Years of education: Not on file    Highest education level: Not on file   Social Needs    Financial resource strain: Not on file    Food insecurity - worry: Not on file    Food insecurity - inability: Not on file    Transportation needs - medical: Not on file    Transportation needs - non-medical: Not on file   Occupational History    Not on file   Tobacco Use    Smoking status: Current Every Day Smoker   Substance and Sexual Activity    Alcohol use: Yes    Drug use: Not on file    Sexual activity: Not on file   Other Topics Concern    Not on file   Social History Narrative    Not on file     "    SUBSTANCE USE:  Social History     Tobacco Use    Smoking status: Current Every Day Smoker   Substance and Sexual Activity    Alcohol use: Yes    Drug use: Not on file    Sexual activity: Not on file      Social History     Tobacco Use    Smoking status: Current Every Day Smoker   Substance Use Topics    Alcohol use: Yes        ALLERGIES: Patient has no known allergies.       Review of Systems   Constitutional: Negative for chills, diaphoresis, fever, malaise/fatigue and weight loss.   HENT: Negative for ear pain, hearing loss, nosebleeds and tinnitus.    Eyes: Negative for blurred vision, double vision, photophobia and pain.   Respiratory: Negative for cough, hemoptysis and shortness of breath.    Cardiovascular: Negative for chest pain, palpitations, orthopnea and leg swelling.   Gastrointestinal: Negative for abdominal pain, blood in stool, constipation, diarrhea, heartburn, nausea and vomiting.   Genitourinary: Negative for dysuria and hematuria.   Musculoskeletal: Negative for falls, joint pain and myalgias.   Skin: Negative for itching and rash.   Neurological: Negative for dizziness, tingling, tremors, sensory change, speech change, focal weakness, loss of consciousness, weakness and headaches.   Endo/Heme/Allergies: Negative for environmental allergies. Does not bruise/bleed easily.   Psychiatric/Behavioral: Negative for depression, hallucinations, memory loss and substance abuse. The patient is not nervous/anxious and does not have insomnia.      PHYSICAL EXAM:    /70   Pulse 70   Ht 5' 11" (1.803 m)   Wt 101.3 kg (223 lb 5.2 oz)   BMI 31.15 kg/m²     Neurologic Exam    Higher Cortical Function:    Patient is a well developed, pleasant, well groomed individual appearing their stated age  Oriented - intact to person, place and time    Fund of knowledge was appropriate.    Language - Speech was fluent without evidence for an aphasia.  Cranial Nerves II - XII:    EOMs were intact with " normal smooth and no nystagmus.    PERRLA.   Motor - facial movement was symmetrical and normal.    Facial sensory - Light touch and pin prick sensations were normal.    Hearing was normal.  Palate moved well and was symmetrical    Tongue movement was full & the patient could speak without difficulty.  Motor: Power, bulk and tone were normal in all extremities.  Coordination:  Normal  Gait:  Normal  Tremor: resting, postural, intentional - none  Cardiovascular:  No edema  Skin: No rash     IMPRESSION  1. 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  EEG, MRI, and PET abnormalities all localize.  2. Prior intracranial monitoring as a child of 14-15 y/o in [place]- deemed not a good surgical candidate due to risk to eloquent cortex per family.    DISPOSITION:   Cont LTG to 300mg bid, inc LEV 1500->2000mg bid, levels today, discussed seizure journal  2. Continue CLZ 0.5mg bid - would be reluctant to wean as he had GTC when this was last attempted  3. Seizure precautions  4. State driving laws explained to patient  5.  Social work consult to help with work clearance    2014 EPILEPSY QUALITY MEASUREMENT (AAN)  1 a. Seizure Frequency: noted  1b. Seizure Intervention: yes  2.                   a. Etiology: unknown  b. Seizure Type: CPS w sec GTC sz  c. Epilepsy Syndrome: n/a  3.                   a. Side-effects of AED: no                        b. Intervention for side-effects: n/a  4. Screening for psychiatric or behavioral disorders: yes  5. Personalized epilepsy safety issues and education: yes  6. Counseling for women of child-bearing potential and epilepsy: n/a  7. Referral of treatment-resistant epilepsy to comprehensive epilepsy center (q 2 years): N/A.     The patient was asked to call me/the clinic 1 week after the test(s) are  done to obtain results.  The following issues were  all discussed in detail with the patient and family/caregiver(s):     1. Diagnosis, plans, prognosis, medications and possible side-effects, risks and benefits of treatment, other alternatives to AEDs.  2. Risks related to continued seizures, status epilepticus, SUDEP, driving restrictions and seizure precautions (no baths but showers are ok, no swimming unsupervised, no use of heavy machinery, no use of sharp moving objects, avoid heights).   3. Issues related to pregnancy, OCP and breast feeding as it relates to epilepsy.  4. The potential of teratogenicity and suicidal risks of anti-epileptic medications.  5. Avoid any activity that compromise patient safety related to seizures.     Questions and concerns raised by the patient and family/care-giver(s) were addressed and they indicated understanding of everything discussed and agreed to plans as above.

## 2019-01-03 ENCOUNTER — TELEPHONE (OUTPATIENT)
Dept: NEUROLOGY | Facility: CLINIC | Age: 34
End: 2019-01-03

## 2019-01-03 NOTE — TELEPHONE ENCOUNTER
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

## 2019-01-11 ENCOUNTER — PATIENT MESSAGE (OUTPATIENT)
Dept: NEUROLOGY | Facility: CLINIC | Age: 34
End: 2019-01-11

## 2019-01-14 NOTE — TELEPHONE ENCOUNTER
I Called the facility [facility] stated that nothing was  Needed and that the  Patient chart have  Been closed

## 2019-01-15 ENCOUNTER — TELEPHONE (OUTPATIENT)
Dept: NEUROLOGY | Facility: CLINIC | Age: 34
End: 2019-01-15

## 2019-01-15 NOTE — TELEPHONE ENCOUNTER
I called back and left a message for Puneet at [facility] with my direct line    ----- Message from Henry Avendaño MD sent at 1/15/2019  9:09 AM CST -----  Contact: Puneet    [facility]    [phone]  Zoë,  Could you contact them and see what's going on?  The patient has epilepsy and has had to take a significantly lower paying job after being fired due to breakthrough seizure that occurred in 2016.  CV    ----- Message -----  From: Steven Galvin MA  Sent: 1/11/2019   2:29 PM  To: MD Puneet Palmer like to speak with the doctor concerning pts work status.  Pls call.     ----- Message -----  From: Anne Marie Kendall  Sent: 1/11/2019   8:58 AM  To: Kevin Howard    Would like to speak with someone concerning pts work status.  Pls call.

## 2019-01-23 ENCOUNTER — PATIENT MESSAGE (OUTPATIENT)
Dept: NEUROLOGY | Facility: CLINIC | Age: 34
End: 2019-01-23

## 2019-01-23 DIAGNOSIS — G40.219 COMPLEX PARTIAL EPILEPSY WITH GENERALIZATION AND WITH INTRACTABLE EPILEPSY: Primary | ICD-10-CM

## 2019-01-23 RX ORDER — CLONAZEPAM 1 MG/1
TABLET ORAL
Qty: 65 TABLET | Refills: 5 | Status: SHIPPED | OUTPATIENT
Start: 2019-01-23 | End: 2019-01-23 | Stop reason: SDUPTHER

## 2019-01-23 RX ORDER — CLONAZEPAM 1 MG/1
TABLET ORAL
Qty: 65 TABLET | Refills: 5 | Status: SHIPPED | OUTPATIENT
Start: 2019-01-23 | End: 2019-07-26 | Stop reason: SDUPTHER

## 2019-05-03 ENCOUNTER — PATIENT MESSAGE (OUTPATIENT)
Dept: NEUROLOGY | Facility: CLINIC | Age: 34
End: 2019-05-03

## 2019-05-03 ENCOUNTER — TELEPHONE (OUTPATIENT)
Dept: NEUROLOGY | Facility: CLINIC | Age: 34
End: 2019-05-03

## 2019-05-03 DIAGNOSIS — G40.109 PARTIAL SYMPTOMATIC EPILEPSY WITH SIMPLE PARTIAL SEIZURES, NOT INTRACTABLE, WITHOUT STATUS EPILEPTICUS: ICD-10-CM

## 2019-05-03 NOTE — TELEPHONE ENCOUNTER
I called in lamictal 150mg-2 BID for 120 tabs plus 5 refills per Dr. Avendaño to [facility]

## 2019-05-06 RX ORDER — LAMOTRIGINE 150 MG/1
300 TABLET ORAL DAILY
Qty: 180 TABLET | Refills: 1 | OUTPATIENT
Start: 2019-05-06 | End: 2020-05-05

## 2019-06-21 ENCOUNTER — TELEPHONE (OUTPATIENT)
Dept: NEUROLOGY | Facility: CLINIC | Age: 34
End: 2019-06-21

## 2019-06-21 NOTE — TELEPHONE ENCOUNTER
----- Message from Henry Avendaño MD sent at 6/21/2019  2:53 PM CDT -----  Contact: Self  Please call and ask what it is that he needs    ----- Message -----  From: Steven Galvin MA  Sent: 6/20/2019   2:17 PM  To: Henry Avendaño MD        ----- Message -----  From: Aleyda Alvarez  Sent: 6/20/2019   8:09 AM  To: Kevin ADLER Staff    Pt is calling to speak with the Staff Nurse regarding a release for [facility] so his restrictions can be lifted.    He can be reached at [phone].    Thank you.

## 2019-07-26 DIAGNOSIS — G40.219 COMPLEX PARTIAL EPILEPSY WITH GENERALIZATION AND WITH INTRACTABLE EPILEPSY: ICD-10-CM

## 2019-07-26 RX ORDER — CLONAZEPAM 1 MG/1
TABLET ORAL
Qty: 65 TABLET | Refills: 5 | Status: SHIPPED | OUTPATIENT
Start: 2019-07-26 | End: 2019-08-05 | Stop reason: SDUPTHER

## 2019-07-26 RX ORDER — LEVETIRACETAM 1000 MG/1
2000 TABLET ORAL 2 TIMES DAILY
Qty: 360 TABLET | Refills: 3 | Status: SHIPPED | OUTPATIENT
Start: 2019-07-26 | End: 2019-08-05 | Stop reason: SDUPTHER

## 2019-08-05 DIAGNOSIS — G40.219 COMPLEX PARTIAL EPILEPSY WITH GENERALIZATION AND WITH INTRACTABLE EPILEPSY: ICD-10-CM

## 2019-08-05 RX ORDER — CLONAZEPAM 1 MG/1
TABLET ORAL
Qty: 65 TABLET | Refills: 5 | Status: SHIPPED | OUTPATIENT
Start: 2019-08-05 | End: 2020-03-10 | Stop reason: SDUPTHER

## 2019-08-05 RX ORDER — LEVETIRACETAM 1000 MG/1
2000 TABLET ORAL 2 TIMES DAILY
Qty: 360 TABLET | Refills: 3 | Status: SHIPPED | OUTPATIENT
Start: 2019-08-05 | End: 2019-12-30 | Stop reason: SDUPTHER

## 2019-12-30 DIAGNOSIS — G40.109 PARTIAL SYMPTOMATIC EPILEPSY WITH SIMPLE PARTIAL SEIZURES, NOT INTRACTABLE, WITHOUT STATUS EPILEPTICUS: ICD-10-CM

## 2019-12-30 DIAGNOSIS — G40.219 COMPLEX PARTIAL EPILEPSY WITH GENERALIZATION AND WITH INTRACTABLE EPILEPSY: ICD-10-CM

## 2019-12-30 RX ORDER — LAMOTRIGINE 150 MG/1
300 TABLET ORAL DAILY
Qty: 180 TABLET | Refills: 1 | Status: SHIPPED | OUTPATIENT
Start: 2019-12-30 | End: 2019-12-31 | Stop reason: SDUPTHER

## 2019-12-30 RX ORDER — LEVETIRACETAM 1000 MG/1
2000 TABLET ORAL 2 TIMES DAILY
Qty: 360 TABLET | Refills: 3 | Status: SHIPPED | OUTPATIENT
Start: 2019-12-30 | End: 2021-01-14 | Stop reason: SDUPTHER

## 2019-12-31 ENCOUNTER — PATIENT MESSAGE (OUTPATIENT)
Dept: NEUROLOGY | Facility: CLINIC | Age: 34
End: 2019-12-31

## 2019-12-31 RX ORDER — LAMOTRIGINE 150 MG/1
300 TABLET ORAL 2 TIMES DAILY
Qty: 360 TABLET | Refills: 3 | Status: SHIPPED | OUTPATIENT
Start: 2019-12-31 | End: 2021-01-14 | Stop reason: SDUPTHER

## 2020-03-10 ENCOUNTER — PATIENT MESSAGE (OUTPATIENT)
Dept: NEUROLOGY | Facility: CLINIC | Age: 35
End: 2020-03-10

## 2020-03-10 DIAGNOSIS — G40.219 COMPLEX PARTIAL EPILEPSY WITH GENERALIZATION AND WITH INTRACTABLE EPILEPSY: ICD-10-CM

## 2020-03-10 RX ORDER — CLONAZEPAM 1 MG/1
TABLET ORAL
Qty: 65 TABLET | Refills: 5 | Status: SHIPPED | OUTPATIENT
Start: 2020-03-10 | End: 2020-09-15 | Stop reason: SDUPTHER

## 2020-03-27 ENCOUNTER — PATIENT MESSAGE (OUTPATIENT)
Dept: NEUROLOGY | Facility: CLINIC | Age: 35
End: 2020-03-27

## 2020-09-15 DIAGNOSIS — G40.219 COMPLEX PARTIAL EPILEPSY WITH GENERALIZATION AND WITH INTRACTABLE EPILEPSY: ICD-10-CM

## 2020-09-15 RX ORDER — CLONAZEPAM 1 MG/1
TABLET ORAL
Qty: 65 TABLET | Refills: 5 | Status: SHIPPED | OUTPATIENT
Start: 2020-09-15 | End: 2021-01-14 | Stop reason: SDUPTHER

## 2021-01-13 ENCOUNTER — OFFICE VISIT (OUTPATIENT)
Dept: NEUROLOGY | Facility: CLINIC | Age: 36
End: 2021-01-13

## 2021-01-13 DIAGNOSIS — G40.219 COMPLEX PARTIAL EPILEPSY WITH GENERALIZATION AND WITH INTRACTABLE EPILEPSY: ICD-10-CM

## 2021-01-13 DIAGNOSIS — Z51.81 MEDICATION MONITORING ENCOUNTER: Primary | ICD-10-CM

## 2021-01-13 DIAGNOSIS — G40.109 PARTIAL SYMPTOMATIC EPILEPSY WITH SIMPLE PARTIAL SEIZURES, NOT INTRACTABLE, WITHOUT STATUS EPILEPTICUS: ICD-10-CM

## 2021-01-13 PROCEDURE — 99214 PR OFFICE/OUTPT VISIT, EST, LEVL IV, 30-39 MIN: ICD-10-PCS | Mod: 95,,, | Performed by: PSYCHIATRY & NEUROLOGY

## 2021-01-13 PROCEDURE — 99214 OFFICE O/P EST MOD 30 MIN: CPT | Mod: 95,,, | Performed by: PSYCHIATRY & NEUROLOGY

## 2021-01-13 RX ORDER — LEVETIRACETAM 500 MG/1
500 TABLET ORAL 2 TIMES DAILY
Qty: 180 TABLET | Refills: 3 | Status: SHIPPED | OUTPATIENT
Start: 2021-01-13 | End: 2021-06-07 | Stop reason: SDUPTHER

## 2021-01-14 RX ORDER — LEVETIRACETAM 1000 MG/1
2000 TABLET ORAL 2 TIMES DAILY
Qty: 360 TABLET | Refills: 3 | Status: SHIPPED | OUTPATIENT
Start: 2021-01-14 | End: 2021-06-07 | Stop reason: SDUPTHER

## 2021-01-14 RX ORDER — LAMOTRIGINE 150 MG/1
300 TABLET ORAL 2 TIMES DAILY
Qty: 360 TABLET | Refills: 3 | Status: SHIPPED | OUTPATIENT
Start: 2021-01-14 | End: 2021-06-02 | Stop reason: SDUPTHER

## 2021-01-14 RX ORDER — CLONAZEPAM 1 MG/1
TABLET ORAL
Qty: 65 TABLET | Refills: 5 | Status: SHIPPED | OUTPATIENT
Start: 2021-01-14 | End: 2021-06-07 | Stop reason: SDUPTHER

## 2021-05-10 ENCOUNTER — PATIENT MESSAGE (OUTPATIENT)
Dept: RESEARCH | Facility: HOSPITAL | Age: 36
End: 2021-05-10

## 2021-06-01 ENCOUNTER — PATIENT MESSAGE (OUTPATIENT)
Dept: NEUROLOGY | Facility: CLINIC | Age: 36
End: 2021-06-01

## 2021-06-07 ENCOUNTER — PATIENT MESSAGE (OUTPATIENT)
Dept: NEUROLOGY | Facility: CLINIC | Age: 36
End: 2021-06-07

## 2021-06-07 DIAGNOSIS — G40.219 COMPLEX PARTIAL EPILEPSY WITH GENERALIZATION AND WITH INTRACTABLE EPILEPSY: ICD-10-CM

## 2021-06-07 DIAGNOSIS — G40.109 PARTIAL SYMPTOMATIC EPILEPSY WITH SIMPLE PARTIAL SEIZURES, NOT INTRACTABLE, WITHOUT STATUS EPILEPTICUS: ICD-10-CM

## 2021-06-07 RX ORDER — LEVETIRACETAM 500 MG/1
500 TABLET ORAL 2 TIMES DAILY
Qty: 180 TABLET | Refills: 3 | Status: SHIPPED | OUTPATIENT
Start: 2021-06-07 | End: 2022-01-04

## 2021-06-07 RX ORDER — LAMOTRIGINE 150 MG/1
300 TABLET ORAL 2 TIMES DAILY
Qty: 360 TABLET | Refills: 3 | Status: SHIPPED | OUTPATIENT
Start: 2021-06-07 | End: 2021-12-07 | Stop reason: SDUPTHER

## 2021-06-07 RX ORDER — CLONAZEPAM 1 MG/1
TABLET ORAL
Qty: 65 TABLET | Refills: 5 | Status: SHIPPED | OUTPATIENT
Start: 2021-06-07 | End: 2021-12-07 | Stop reason: SDUPTHER

## 2021-06-07 RX ORDER — LEVETIRACETAM 1000 MG/1
2000 TABLET ORAL 2 TIMES DAILY
Qty: 360 TABLET | Refills: 3 | Status: SHIPPED | OUTPATIENT
Start: 2021-06-07 | End: 2022-02-28

## 2021-09-03 ENCOUNTER — PATIENT MESSAGE (OUTPATIENT)
Dept: NEUROLOGY | Facility: CLINIC | Age: 36
End: 2021-09-03

## 2021-12-07 DIAGNOSIS — G40.109 PARTIAL SYMPTOMATIC EPILEPSY WITH SIMPLE PARTIAL SEIZURES, NOT INTRACTABLE, WITHOUT STATUS EPILEPTICUS: ICD-10-CM

## 2021-12-07 DIAGNOSIS — G40.219 COMPLEX PARTIAL EPILEPSY WITH GENERALIZATION AND WITH INTRACTABLE EPILEPSY: ICD-10-CM

## 2021-12-07 RX ORDER — CLONAZEPAM 1 MG/1
TABLET ORAL
Qty: 65 TABLET | Refills: 5 | Status: SHIPPED | OUTPATIENT
Start: 2021-12-07 | End: 2021-12-08 | Stop reason: SDUPTHER

## 2021-12-07 RX ORDER — LAMOTRIGINE 150 MG/1
300 TABLET ORAL 2 TIMES DAILY
Qty: 360 TABLET | Refills: 1 | Status: SHIPPED | OUTPATIENT
Start: 2021-12-07 | End: 2022-03-25 | Stop reason: SDUPTHER

## 2021-12-08 RX ORDER — CLONAZEPAM 1 MG/1
TABLET ORAL
Qty: 65 TABLET | Refills: 5 | Status: SHIPPED | OUTPATIENT
Start: 2021-12-08 | End: 2022-03-25 | Stop reason: SDUPTHER

## 2022-03-25 ENCOUNTER — LAB VISIT (OUTPATIENT)
Dept: LAB | Facility: HOSPITAL | Age: 37
End: 2022-03-25
Attending: PSYCHIATRY & NEUROLOGY
Payer: MEDICAID

## 2022-03-25 ENCOUNTER — OFFICE VISIT (OUTPATIENT)
Dept: NEUROLOGY | Facility: CLINIC | Age: 37
End: 2022-03-25
Payer: MEDICAID

## 2022-03-25 VITALS — BODY MASS INDEX: 31.22 KG/M2 | WEIGHT: 223 LBS | HEIGHT: 71 IN

## 2022-03-25 DIAGNOSIS — G40.219 COMPLEX PARTIAL EPILEPSY WITH GENERALIZATION AND WITH INTRACTABLE EPILEPSY: ICD-10-CM

## 2022-03-25 DIAGNOSIS — G40.109 PARTIAL SYMPTOMATIC EPILEPSY WITH SIMPLE PARTIAL SEIZURES, NOT INTRACTABLE, WITHOUT STATUS EPILEPTICUS: ICD-10-CM

## 2022-03-25 DIAGNOSIS — R53.83 FATIGUE, UNSPECIFIED TYPE: ICD-10-CM

## 2022-03-25 DIAGNOSIS — R53.83 FATIGUE, UNSPECIFIED TYPE: Primary | ICD-10-CM

## 2022-03-25 LAB
ALBUMIN SERPL BCP-MCNC: 4.4 G/DL (ref 3.5–5.2)
ALP SERPL-CCNC: 76 U/L (ref 55–135)
ALT SERPL W/O P-5'-P-CCNC: 30 U/L (ref 10–44)
ANION GAP SERPL CALC-SCNC: 9 MMOL/L (ref 8–16)
AST SERPL-CCNC: 21 U/L (ref 10–40)
BASOPHILS # BLD AUTO: 0.05 K/UL (ref 0–0.2)
BASOPHILS NFR BLD: 0.6 % (ref 0–1.9)
BILIRUB SERPL-MCNC: 0.5 MG/DL (ref 0.1–1)
BUN SERPL-MCNC: 10 MG/DL (ref 6–20)
CALCIUM SERPL-MCNC: 10.2 MG/DL (ref 8.7–10.5)
CHLORIDE SERPL-SCNC: 101 MMOL/L (ref 95–110)
CO2 SERPL-SCNC: 30 MMOL/L (ref 23–29)
CREAT SERPL-MCNC: 0.7 MG/DL (ref 0.5–1.4)
DIFFERENTIAL METHOD: ABNORMAL
EOSINOPHIL # BLD AUTO: 0.1 K/UL (ref 0–0.5)
EOSINOPHIL NFR BLD: 1.1 % (ref 0–8)
ERYTHROCYTE [DISTWIDTH] IN BLOOD BY AUTOMATED COUNT: 12.1 % (ref 11.5–14.5)
EST. GFR  (AFRICAN AMERICAN): >60 ML/MIN/1.73 M^2
EST. GFR  (NON AFRICAN AMERICAN): >60 ML/MIN/1.73 M^2
GLUCOSE SERPL-MCNC: 101 MG/DL (ref 70–110)
HCT VFR BLD AUTO: 40 % (ref 40–54)
HGB BLD-MCNC: 13.2 G/DL (ref 14–18)
IMM GRANULOCYTES # BLD AUTO: 0.03 K/UL (ref 0–0.04)
IMM GRANULOCYTES NFR BLD AUTO: 0.3 % (ref 0–0.5)
LYMPHOCYTES # BLD AUTO: 2.5 K/UL (ref 1–4.8)
LYMPHOCYTES NFR BLD: 28.7 % (ref 18–48)
MCH RBC QN AUTO: 30.2 PG (ref 27–31)
MCHC RBC AUTO-ENTMCNC: 33 G/DL (ref 32–36)
MCV RBC AUTO: 92 FL (ref 82–98)
MONOCYTES # BLD AUTO: 0.6 K/UL (ref 0.3–1)
MONOCYTES NFR BLD: 7.4 % (ref 4–15)
NEUTROPHILS # BLD AUTO: 5.4 K/UL (ref 1.8–7.7)
NEUTROPHILS NFR BLD: 61.9 % (ref 38–73)
NRBC BLD-RTO: 0 /100 WBC
PLATELET # BLD AUTO: 255 K/UL (ref 150–450)
PMV BLD AUTO: 9.9 FL (ref 9.2–12.9)
POTASSIUM SERPL-SCNC: 4.1 MMOL/L (ref 3.5–5.1)
PROT SERPL-MCNC: 7.7 G/DL (ref 6–8.4)
RBC # BLD AUTO: 4.37 M/UL (ref 4.6–6.2)
SODIUM SERPL-SCNC: 140 MMOL/L (ref 136–145)
TSH SERPL DL<=0.005 MIU/L-ACNC: 1.36 UIU/ML (ref 0.4–4)
VIT B12 SERPL-MCNC: 386 PG/ML (ref 210–950)
WBC # BLD AUTO: 8.7 K/UL (ref 3.9–12.7)

## 2022-03-25 PROCEDURE — 84425 ASSAY OF VITAMIN B-1: CPT | Performed by: PSYCHIATRY & NEUROLOGY

## 2022-03-25 PROCEDURE — 80053 COMPREHEN METABOLIC PANEL: CPT | Performed by: PSYCHIATRY & NEUROLOGY

## 2022-03-25 PROCEDURE — 99214 PR OFFICE/OUTPT VISIT, EST, LEVL IV, 30-39 MIN: ICD-10-PCS | Mod: S$PBB,,, | Performed by: PSYCHIATRY & NEUROLOGY

## 2022-03-25 PROCEDURE — 99999 PR PBB SHADOW E&M-EST. PATIENT-LVL III: CPT | Mod: PBBFAC,,, | Performed by: PSYCHIATRY & NEUROLOGY

## 2022-03-25 PROCEDURE — 1159F PR MEDICATION LIST DOCUMENTED IN MEDICAL RECORD: ICD-10-PCS | Mod: CPTII,,, | Performed by: PSYCHIATRY & NEUROLOGY

## 2022-03-25 PROCEDURE — 99214 OFFICE O/P EST MOD 30 MIN: CPT | Mod: S$PBB,,, | Performed by: PSYCHIATRY & NEUROLOGY

## 2022-03-25 PROCEDURE — 80177 DRUG SCRN QUAN LEVETIRACETAM: CPT | Performed by: PSYCHIATRY & NEUROLOGY

## 2022-03-25 PROCEDURE — 84443 ASSAY THYROID STIM HORMONE: CPT | Performed by: PSYCHIATRY & NEUROLOGY

## 2022-03-25 PROCEDURE — 3008F PR BODY MASS INDEX (BMI) DOCUMENTED: ICD-10-PCS | Mod: CPTII,,, | Performed by: PSYCHIATRY & NEUROLOGY

## 2022-03-25 PROCEDURE — 80175 DRUG SCREEN QUAN LAMOTRIGINE: CPT | Performed by: PSYCHIATRY & NEUROLOGY

## 2022-03-25 PROCEDURE — 82607 VITAMIN B-12: CPT | Performed by: PSYCHIATRY & NEUROLOGY

## 2022-03-25 PROCEDURE — 84207 ASSAY OF VITAMIN B-6: CPT | Performed by: PSYCHIATRY & NEUROLOGY

## 2022-03-25 PROCEDURE — 99999 PR PBB SHADOW E&M-EST. PATIENT-LVL III: ICD-10-PCS | Mod: PBBFAC,,, | Performed by: PSYCHIATRY & NEUROLOGY

## 2022-03-25 PROCEDURE — 99213 OFFICE O/P EST LOW 20 MIN: CPT | Mod: PBBFAC | Performed by: PSYCHIATRY & NEUROLOGY

## 2022-03-25 PROCEDURE — 1159F MED LIST DOCD IN RCRD: CPT | Mod: CPTII,,, | Performed by: PSYCHIATRY & NEUROLOGY

## 2022-03-25 PROCEDURE — 85025 COMPLETE CBC W/AUTO DIFF WBC: CPT | Performed by: PSYCHIATRY & NEUROLOGY

## 2022-03-25 PROCEDURE — 3008F BODY MASS INDEX DOCD: CPT | Mod: CPTII,,, | Performed by: PSYCHIATRY & NEUROLOGY

## 2022-03-25 RX ORDER — CLONAZEPAM 1 MG/1
TABLET ORAL
Qty: 65 TABLET | Refills: 5 | Status: SHIPPED | OUTPATIENT
Start: 2022-03-25 | End: 2022-10-11 | Stop reason: SDUPTHER

## 2022-03-25 RX ORDER — LAMOTRIGINE 150 MG/1
300 TABLET ORAL 2 TIMES DAILY
Qty: 360 TABLET | Refills: 3 | Status: SHIPPED | OUTPATIENT
Start: 2022-03-25 | End: 2022-11-29

## 2022-03-25 RX ORDER — LEVETIRACETAM 500 MG/1
500 TABLET ORAL 2 TIMES DAILY
Qty: 180 TABLET | Refills: 3 | Status: SHIPPED | OUTPATIENT
Start: 2022-03-25 | End: 2022-10-01

## 2022-03-25 RX ORDER — LEVETIRACETAM 1000 MG/1
2000 TABLET ORAL 2 TIMES DAILY
Qty: 360 TABLET | Refills: 3 | Status: SHIPPED | OUTPATIENT
Start: 2022-03-25 | End: 2022-11-28 | Stop reason: SDUPTHER

## 2022-03-25 NOTE — PATIENT INSTRUCTIONS
FOLLOW UP WITH HOME SLEEP STUDY TO EVALUATE FOR SLEEP APNEA    CHECK LABS TODAY    CONTINUE KEPPRA, LAMICAL, AND CLONAZEPAM AT SAME DOSE    CONTINUE TO MINIMIZE ALCOHOL
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
## 2022-03-28 LAB
LAMOTRIGINE SERPL-MCNC: 10 UG/ML (ref 2–15)
LEVETIRACETAM SERPL-MCNC: 56.4 UG/ML (ref 3–60)

## 2022-03-29 LAB — PYRIDOXAL SERPL-MCNC: 14 UG/L (ref 5–50)

## 2022-03-30 LAB — VIT B1 BLD-MCNC: 67 UG/L (ref 38–122)

## 2022-04-01 ENCOUNTER — TELEPHONE (OUTPATIENT)
Dept: SLEEP MEDICINE | Facility: OTHER | Age: 37
End: 2022-04-01
Payer: MEDICAID

## 2022-04-06 ENCOUNTER — TELEPHONE (OUTPATIENT)
Dept: SLEEP MEDICINE | Facility: OTHER | Age: 37
End: 2022-04-06
Payer: MEDICAID

## 2022-04-18 ENCOUNTER — TELEPHONE (OUTPATIENT)
Dept: SLEEP MEDICINE | Facility: OTHER | Age: 37
End: 2022-04-18
Payer: MEDICAID

## 2022-04-28 ENCOUNTER — TELEPHONE (OUTPATIENT)
Dept: SLEEP MEDICINE | Facility: OTHER | Age: 37
End: 2022-04-28
Payer: MEDICAID

## 2022-04-28 NOTE — TELEPHONE ENCOUNTER
Not able to leave a message to schedule his home sleep study,v/m is full.  Sent out a message through [employer] to schedule.

## 2022-05-11 ENCOUNTER — TELEPHONE (OUTPATIENT)
Dept: SLEEP MEDICINE | Facility: OTHER | Age: 37
End: 2022-05-11
Payer: MEDICAID

## 2022-05-12 ENCOUNTER — TELEPHONE (OUTPATIENT)
Dept: SLEEP MEDICINE | Facility: OTHER | Age: 37
End: 2022-05-12
Payer: MEDICAID

## 2022-05-24 ENCOUNTER — PATIENT MESSAGE (OUTPATIENT)
Dept: NEUROLOGY | Facility: CLINIC | Age: 37
End: 2022-05-24
Payer: MEDICAID

## 2022-10-11 ENCOUNTER — PATIENT MESSAGE (OUTPATIENT)
Dept: NEUROLOGY | Facility: CLINIC | Age: 37
End: 2022-10-11
Payer: MEDICAID

## 2022-11-28 ENCOUNTER — PATIENT MESSAGE (OUTPATIENT)
Dept: NEUROLOGY | Facility: CLINIC | Age: 37
End: 2022-11-28
Payer: MEDICAID

## 2023-05-26 ENCOUNTER — TELEPHONE (OUTPATIENT)
Dept: NEUROLOGY | Facility: CLINIC | Age: 38
End: 2023-05-26
Payer: MEDICAID

## 2023-05-26 ENCOUNTER — PATIENT MESSAGE (OUTPATIENT)
Dept: NEUROLOGY | Facility: CLINIC | Age: 38
End: 2023-05-26
Payer: MEDICAID

## 2023-05-26 DIAGNOSIS — G40.219 COMPLEX PARTIAL EPILEPSY WITH GENERALIZATION AND WITH INTRACTABLE EPILEPSY: ICD-10-CM

## 2023-05-26 DIAGNOSIS — G40.109 PARTIAL SYMPTOMATIC EPILEPSY WITH SIMPLE PARTIAL SEIZURES, NOT INTRACTABLE, WITHOUT STATUS EPILEPTICUS: ICD-10-CM

## 2023-05-26 RX ORDER — LEVETIRACETAM 500 MG/1
500 TABLET ORAL 2 TIMES DAILY
Qty: 180 TABLET | Refills: 0 | Status: SHIPPED | OUTPATIENT
Start: 2023-05-26 | End: 2023-06-02 | Stop reason: SDUPTHER

## 2023-05-26 RX ORDER — LEVETIRACETAM 1000 MG/1
2000 TABLET ORAL 2 TIMES DAILY
Qty: 360 TABLET | Refills: 0 | Status: SHIPPED | OUTPATIENT
Start: 2023-05-26 | End: 2023-06-02 | Stop reason: SDUPTHER

## 2023-05-26 RX ORDER — CLONAZEPAM 1 MG/1
TABLET ORAL
Qty: 65 TABLET | Refills: 0 | Status: SHIPPED | OUTPATIENT
Start: 2023-05-26 | End: 2023-06-02 | Stop reason: SDUPTHER

## 2023-05-26 RX ORDER — LAMOTRIGINE 150 MG/1
300 TABLET ORAL 2 TIMES DAILY
Qty: 360 TABLET | Refills: 0 | Status: SHIPPED | OUTPATIENT
Start: 2023-05-26 | End: 2023-06-02 | Stop reason: SDUPTHER

## 2023-05-26 RX ORDER — CLONAZEPAM 1 MG/1
TABLET ORAL
Qty: 65 TABLET | Refills: 0 | OUTPATIENT
Start: 2023-05-26

## 2023-05-26 NOTE — TELEPHONE ENCOUNTER
----- Message from Jocelin Baker sent at 5/26/2023  4:13 PM CDT -----  Regarding: Refill  Contact: Sherry [phone]  Sherry/ [facility] is calling to get refill on RX: clonazePAM (KLONOPIN) 1 MG tablet 65 tablet and verify dose of Rx: Keppra please call       [facility], [facility] - [place], [place] - [address] [facility] [phone] [address] [address]  Phone: [phone] Fax: [phone]

## 2023-05-26 NOTE — TELEPHONE ENCOUNTER
----- Message from Vikram Antonio sent at 5/26/2023 12:16 PM CDT -----  Regarding: adv  Contact: @[phone]  Pt calling in regards to clonazePAM (KLONOPIN) 1 MG tablet states he runs out on Sunday and hasnt heard back states he needs asap... also sent a message in portal... pls call and adv@[phone]  or leave a message in portal

## 2023-06-02 ENCOUNTER — OFFICE VISIT (OUTPATIENT)
Dept: NEUROLOGY | Facility: CLINIC | Age: 38
End: 2023-06-02
Payer: MEDICAID

## 2023-06-02 ENCOUNTER — LAB VISIT (OUTPATIENT)
Dept: LAB | Facility: HOSPITAL | Age: 38
End: 2023-06-02
Payer: MEDICAID

## 2023-06-02 VITALS
BODY MASS INDEX: 33.34 KG/M2 | DIASTOLIC BLOOD PRESSURE: 83 MMHG | SYSTOLIC BLOOD PRESSURE: 133 MMHG | HEART RATE: 86 BPM | WEIGHT: 238.13 LBS | HEIGHT: 71 IN

## 2023-06-02 DIAGNOSIS — G40.219 COMPLEX PARTIAL EPILEPSY WITH GENERALIZATION AND WITH INTRACTABLE EPILEPSY: ICD-10-CM

## 2023-06-02 DIAGNOSIS — G40.219 COMPLEX PARTIAL EPILEPSY WITH GENERALIZATION AND WITH INTRACTABLE EPILEPSY: Primary | ICD-10-CM

## 2023-06-02 DIAGNOSIS — G40.109 PARTIAL SYMPTOMATIC EPILEPSY WITH SIMPLE PARTIAL SEIZURES, NOT INTRACTABLE, WITHOUT STATUS EPILEPTICUS: ICD-10-CM

## 2023-06-02 PROCEDURE — 1159F PR MEDICATION LIST DOCUMENTED IN MEDICAL RECORD: ICD-10-PCS | Mod: CPTII,,,

## 2023-06-02 PROCEDURE — 3008F BODY MASS INDEX DOCD: CPT | Mod: CPTII,,,

## 2023-06-02 PROCEDURE — 99999 PR PBB SHADOW E&M-EST. PATIENT-LVL III: CPT | Mod: PBBFAC,,,

## 2023-06-02 PROCEDURE — 99214 OFFICE O/P EST MOD 30 MIN: CPT | Mod: S$PBB,,,

## 2023-06-02 PROCEDURE — 3079F PR MOST RECENT DIASTOLIC BLOOD PRESSURE 80-89 MM HG: ICD-10-PCS | Mod: CPTII,,,

## 2023-06-02 PROCEDURE — 99999 PR PBB SHADOW E&M-EST. PATIENT-LVL III: ICD-10-PCS | Mod: PBBFAC,,,

## 2023-06-02 PROCEDURE — 3075F PR MOST RECENT SYSTOLIC BLOOD PRESS GE 130-139MM HG: ICD-10-PCS | Mod: CPTII,,,

## 2023-06-02 PROCEDURE — 36415 COLL VENOUS BLD VENIPUNCTURE: CPT

## 2023-06-02 PROCEDURE — 1159F MED LIST DOCD IN RCRD: CPT | Mod: CPTII,,,

## 2023-06-02 PROCEDURE — 99214 PR OFFICE/OUTPT VISIT, EST, LEVL IV, 30-39 MIN: ICD-10-PCS | Mod: S$PBB,,,

## 2023-06-02 PROCEDURE — 3075F SYST BP GE 130 - 139MM HG: CPT | Mod: CPTII,,,

## 2023-06-02 PROCEDURE — 3079F DIAST BP 80-89 MM HG: CPT | Mod: CPTII,,,

## 2023-06-02 PROCEDURE — 80177 DRUG SCRN QUAN LEVETIRACETAM: CPT

## 2023-06-02 PROCEDURE — 99213 OFFICE O/P EST LOW 20 MIN: CPT | Mod: PBBFAC

## 2023-06-02 PROCEDURE — 80175 DRUG SCREEN QUAN LAMOTRIGINE: CPT

## 2023-06-02 PROCEDURE — 3008F PR BODY MASS INDEX (BMI) DOCUMENTED: ICD-10-PCS | Mod: CPTII,,,

## 2023-06-02 PROCEDURE — 80346 BENZODIAZEPINES1-12: CPT

## 2023-06-02 RX ORDER — LEVETIRACETAM 500 MG/1
500 TABLET ORAL 2 TIMES DAILY
Qty: 180 TABLET | Refills: 3 | Status: SHIPPED | OUTPATIENT
Start: 2023-06-02 | End: 2024-06-02

## 2023-06-02 RX ORDER — LAMOTRIGINE 150 MG/1
300 TABLET ORAL 2 TIMES DAILY
Qty: 360 TABLET | Refills: 3 | Status: SHIPPED | OUTPATIENT
Start: 2023-06-02 | End: 2024-06-02

## 2023-06-02 RX ORDER — CLONAZEPAM 1 MG/1
TABLET ORAL
Qty: 65 TABLET | Refills: 3 | Status: SHIPPED | OUTPATIENT
Start: 2023-06-02 | End: 2023-10-23

## 2023-06-02 RX ORDER — LEVETIRACETAM 1000 MG/1
2000 TABLET ORAL 2 TIMES DAILY
Qty: 360 TABLET | Refills: 3 | Status: SHIPPED | OUTPATIENT
Start: 2023-06-02 | End: 2023-11-14

## 2023-06-02 NOTE — PROGRESS NOTES
Name: Steven Ayala Jr.  MRN: [id number]   CSN: [id number]      Date: 06/02/2023    HISTORY OF PRESENT ILLNESS (HPI)  The patient is a 37 y.o.  initially referred for consultation by Dr Darrian Copeland in .     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    Interval Events/ROS 6/2/2023:    Current ASM/SEs: lamotrigine 300mg BID, levetiracetam 2500mg BID, and clonazepam 1mg BID; no SE  Breakthrough seizures/events: no GTCs in years; small brief focal events w/ maintained awareness (described as odd feeling) around once every 2 weeks, reports frequency of focal events is improved and they are not very bothersome to him or disruptive to daily life   Driving: yes  Sleep: good  Mood: fair    Otherwise, no fever, no cold symptoms, no headache, no changes in vision, no new weakness, no chest pain, no shortness of breath, no nausea, no vomiting, no diarrhea, no constipation, no tingling/numbness, no problems walking. Patient has no complaints today.     Recent Labs   Lab 03/25/22  0859   Levetiracetam Lvl 56.4   Lamotrigine Lvl 10.0       Prior note:  FROM Dr Copeland:  No seizure . He is here for paper works. He is telling that when he was very young , he was told that he has scar tissue in the brain. He is here to go over the MRI of the brain. He has some seizure like activities frequently despite he takes medication on time.    INTERVAL HX:    -  Around December developed increased fatigue and somnolence, he and wife note stressor of total home destruction in [place] and only were able to move into a new house two weeks ago  -  Notes unrestful sleep with snoring and daytime somnolence  -  Increased LEV with no issues, past several months SPS have been occurring daily    1/2021  -  Reduced frequency of SPS with last LEV increase, estimates 1/week, furloughed from work due to pandemic,  prior difficulty with dizziness with higher LTG dose  -  Some daytime fatigue/somnolence, snores but no witnessed apneas by wife  12/2018  SPS decreased from 1-2 daily to 1-2/week with LTG increase, tolerating well  9/2018  SPS briefly resolved following increase in LEV but have recurred, tolerating medications          Past Medical History:   Diagnosis Date    Seizures        History reviewed. No pertinent surgical history.  History reviewed. No pertinent family history.       Social History   Substance Use Topics    Smoking status: Current Every Day Smoker    Smokeless tobacco: None    Alcohol use Yes      Review of patient's allergies indicates:  No Known Allergies   There is no problem list on file for this patient.     Review of Systems   Constitutional: Negative for fever and weight loss.   HENT: Negative for ear pain, hearing loss and tinnitus.    Eyes: Negative for blurred vision, double vision, photophobia, pain, discharge and redness.   Respiratory: Negative for cough and shortness of breath.    Cardiovascular: Negative for chest pain, palpitations, claudication and leg swelling.   Gastrointestinal: Negative for abdominal pain, heartburn, nausea and vomiting.   Genitourinary: Negative for dysuria, flank pain, frequency and urgency.   Musculoskeletal: Negative for back pain, falls, joint pain, myalgias and neck pain.   Skin: Negative for itching and rash.   Neurological: Positive for seizures. Negative for dizziness, tingling, tremors, sensory change, speech change, focal weakness, loss of consciousness, weakness and headaches.   Endo/Heme/Allergies: Does not bruise/bleed easily.   Psychiatric/Behavioral: Negative for depression, hallucinations, memory loss and suicidal ideas.  The patient does not have insomnia.          OBJECTIVE:      Physical Exam   Constitutional: He is oriented to person, place, and time. He appears well-developed and well-nourished. No distress.   HENT:   Head: Normocephalic.   Right  Ear: External ear normal.   Left Ear: External ear normal.   Mouth/Throat: Oropharynx is clear and moist.   Eyes: Conjunctivae and EOM are normal. Pupils are equal, round, and reactive to light.   Neck: Normal range of motion. Neck supple. No tracheal deviation present. No thyromegaly present.   Cardiovascular: extremities well perfused.    Pulmonary/Chest: Effort normal No respiratory distress.   Abdominal: Non distended  Musculoskeletal: He exhibits no edema or tenderness.   Lymphadenopathy:     He has no cervical adenopathy.   Neurological: He is alert and oriented to person, place, and time. He has normal strength and normal reflexes. He displays no tremor. No cranial nerve deficit or sensory deficit. He exhibits normal muscle tone. Coordination normal.   Skin: Skin is warm and dry. No rash noted. He is not diaphoretic. No erythema. No pallor.   Psychiatric: He has a normal mood and affect. His behavior is normal. Judgment and thought content normal.         MRI of the brain: 3/19/2018     Impression          No definite acute process.    Several nonspecific 2 mm subcortical white matter hyperintensity signal foci.    Small zone of increased T2 signal right occipital cortex which may represent gliosis or post traumatic encephalomalacia.  Please correlate with clinical history.    Otherwise negative.        Seizure Triggers  Sleep Deprivation - None  Other medications - None  Psych/stress - None  Photic stimulation - None  Hyperventilation - None  Medical Problems - None  Menses - No  Sensory Stimulation (light, sound, etc) - None  Missed dose of meds - None    AED Treatments  Present regimen  LTG 300mg AM / 300mg PM  LEV 2500mg BID  Klonopin 1mg BID    Prior treatments  Unclear    Not tried  acetazolamide (Diamox, AZM)  amantadine  carbamazepine (Tegretol, CBZ)  clobezam (Onfi or Frizium, CLB)  ethosuximide (Zarontin, ESM)  eslicarbazine (Aptiom, ESL)  felbamate (felbatol, FBM)  gabapentin (Neurontin,  GBP)  lacosamide (Vimpat, LCM)   lamotrigine (Lamictal, LTG)   levetiracetam (Keppra, LEV)  methsuximide (Celontin, MSM)  methyphenytoin (Mesantion, MHT)  oxcarbazepine (Trileptal OXC)  perampanel (Fycompa, FCP)   phenobarbital (Pb)  phenytoin (Dilantin, PHT)  pregabalin (Lyrica, PGB)  primidone (Mysoline, PRM)  retigabine (Potiga, RTG)  rufinamide (Banzel, RUF)  tiagabine (Gabatril,  TGB)  topiramate (Topamax, TPM)  viagabatrin, (Sabril, VGB)  vagal nerve stimulator (VNS)  valproic acid (Depakote, VPA)  zonisamide (Zonegran, ZNS)  Benzodiazepines  diazepam - rectal (Diastatl)  diazepam - oral (Valium, DZ)  clonazepam (Klonopin, CZP)  clorazepate (Tranxene, CLZ)  Ativan  Brain Stimulation  Vagal Nerve Stimulation-n/a  DBS- n/a    Compliance method  Memory - yes  Mom or Spouse - Yes  Pill Box - no  Pankaj calendar - no  Turn over medication bottle - no  Phone alarm - no    Seizure Evaluation  EEG Routine -   EEG Ambulatory -   EEG\Video Monitoring -   MRI/MRA -   CT/CTA Scan -   PET Scan -   Neuropsychological evaluation -   DEXA Scan    Potential Epilepsy Risk Factors:   Pregnancy/Labor/Delivery - full term uncomplicated pregnancy labor and vaginal delivery  Febrile seizures - none  Head injury  - none  CNS infection - none     Stroke - none  Family Hx of Sz - none    PAST MEDICAL HISTORY:   Active Ambulatory Problems     Diagnosis Date Noted    Complex partial epilepsy with generalization and with intractable epilepsy      Resolved Ambulatory Problems     Diagnosis Date Noted    No Resolved Ambulatory Problems     Past Medical History:   Diagnosis Date    Seizures         PAST SURGICAL HISTORY: No past surgical history on file.     FAMILY HISTORY: No family history on file.      SOCIAL HISTORY:   Social History     Socioeconomic History    Marital status:    Tobacco Use    Smoking status: Every Day   Substance and Sexual Activity    Alcohol use: Yes        SUBSTANCE USE:  Social History     Tobacco Use     "Smoking status: Every Day    Smokeless tobacco: Not on file   Substance and Sexual Activity    Alcohol use: Yes    Drug use: Not on file    Sexual activity: Not on file      Social History     Tobacco Use    Smoking status: Every Day    Smokeless tobacco: Not on file   Substance Use Topics    Alcohol use: Yes        ALLERGIES: Patient has no known allergies.     PHYSICAL EXAM:    /83   Pulse 86   Ht 5' 11" (1.803 m)   Wt 108 kg (238 lb 1.6 oz)   BMI 33.21 kg/m²     Neurologic Exam    Higher Cortical Function:    Patient is a well developed, pleasant, well groomed individual appearing their stated age  Oriented - intact to person, place and time    Fund of knowledge was appropriate.    Language - Speech was fluent without evidence for an aphasia.  Cranial Nerves II - XII:    EOMs were intact with normal smooth and no nystagmus.    Motor - facial movement was symmetrical and normal.    Facial sensory - NA    Hearing was normal.  Palate moved well and was symmetrical    Tongue movement was full & the patient could speak without difficulty.  Motor: Power, bulk and tone were normal in all extremities.  Coordination:  Normal  Gait:  Normal  Tremor: resting, postural, intentional - none  Cardiovascular:  No edema  Skin: No rash     IMPRESSION  1. 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  EEG, MRI, and PET abnormalities all localize.  2. Prior intracranial monitoring as a child of 14-15 y/o in [place]- deemed not a good surgical candidate due to risk to eloquent cortex per family.    DISPOSITION:   - continue lamotrigine 300mg BID  - continue levetiracetam 2500mg BID  - continue clonazepam 1mg BID, would be reluctant to wean as he had GTC when this was last attempted  - patient is comfortable with his current seizure control, " if seizure frequency increases will consider escalation of ASM regimen at that time  - annual levels today  - return to clinic in one year or sooner with issues   - advised to reach out over the portal with any concerns     Patient is comfortable with plan. All questions and concerns are addressed at this time.     Angella Boyd PA-C   Neurology-Epilepsy  [facility]    Collaborating physician, Dr. Henry Avendaño, was available during today's encounter.     I spent approximately 35 minutes on the day of this encounter preparing to see the patient, obtaining and reviewing history and results, performing a medically appropriate exam, counseling and educating the patient/family/caregiver, documenting clinical information, coordinating care, and ordering medications, tests, procedures, and referrals.    2014 EPILEPSY QUALITY MEASUREMENT (AAN)  1 a. Seizure Frequency: noted  1b. Seizure Intervention: yes  2.                   a. Etiology: unknown  b. Seizure Type: CPS w sec GTC sz  c. Epilepsy Syndrome: n/a  3.                   a. Side-effects of AED: no                        b. Intervention for side-effects: n/a  4. Screening for psychiatric or behavioral disorders: yes  5. Personalized epilepsy safety issues and education: yes  6. Counseling for women of child-bearing potential and epilepsy: n/a  7. Referral of treatment-resistant epilepsy to comprehensive epilepsy center (q 2 years): N/A.     The patient was asked to call me/the clinic 1 week after the test(s) are done to obtain results.  The following issues were  all discussed in detail with the patient and family/caregiver(s):     1. Diagnosis, plans, prognosis, medications and possible side-effects, risks and benefits of treatment, other alternatives to AEDs.  2. Risks related to continued seizures, status epilepticus, SUDEP, driving restrictions and seizure precautions (no baths but showers are ok, no swimming unsupervised, no use of  heavy machinery, no use of sharp moving objects, avoid heights).   3. Issues related to pregnancy, OCP and breast feeding as it relates to epilepsy.  4. The potential of teratogenicity and suicidal risks of anti-epileptic medications.  5. Avoid any activity that compromise patient safety related to seizures.     Questions and concerns raised by the patient and family/care-giver(s) were addressed and they indicated understanding of everything discussed and agreed to plans as above.

## 2023-06-02 NOTE — PATIENT INSTRUCTIONS
You came to [facility]psy [facility] because of your seizure disorder.  Your seizures are well controlled on keppra 2500mg twice daily, lamotrigine 300mg twice daily, and clonazepam 1mg twice daily.  Please continue the same medications at the same dose.     Do not miss any doses of medication. If a dose of medication is missed, take it as soon as it is remembered even if that means doubling up on the dose. Please get a lab test to check out the blood level of medication. Get regular sleep. Go to sleep at the same time and wake up at the same time every day. People with epilepsy require more sleep than people without epilepsy.  Sleeping 10-12 hours a day can be normal for a person with epilepsy.  Every seizure makes it harder to prevent the next seizure. Epilepsy is associated with SUDEP, or sudden unexpected death in epilepsy.  The risk is significantly higher if convulsive seizures are not well controlled. For more information, check out these websites: https://www.epilepsy.com/, https://www.epilepsyalliance[place].org/, www.[place]-epilepsy.org, www.womenandepilepsy.org.  If you are interested in meeting other individuals in our epilepsy community, please reach out to the [facility] [place] ([phone], [phone], info@[place].org).  They organize many informative and fun activities in the region.  They can provide you invaluable information on how to get access to resources available for patients living with epilepsy as well as a rich community of like-minded individuals who are all learning to cope with the same issues.  It is very important to remember, you are not alone.     Per [place] law, no episodes of loss of consciousness for 6 months before driving.  Avoid dangerous situations.  For example, no baths/pools alone, no heights, no power tools.  Wear a bike helmet.  If breakthrough seizures occur that are different in character, frequency, or duration from normal episodes, please patient  portal me or call the office and we will decide the next steps. If multiple seizures occur in a row without return back to baseline, 911 needs to be called.     Return to clinic in one year or sooner with issues.  Please patient portal with any questions or concerns.    Angella Boyd PA-C   Neurology-Epilepsy  [facility]

## 2023-06-05 LAB
LAMOTRIGINE SERPL-MCNC: 9.4 UG/ML (ref 2–15)
LEVETIRACETAM SERPL-MCNC: 40.7 UG/ML (ref 3–60)

## 2023-06-05 NOTE — PROGRESS NOTES
I have seen the patient, reviewed the Physician Assistant's history and physical, assessment, and plan. I have personally interviewed and examined the patient at bedside and agree with the findings.       Henry Avendaño MD  Neurology  [facility] - Neurology 7th Fl

## 2023-06-07 LAB
7AMINOCLONAZEPAM SERPL-MCNC: 7.9 NG/ML
CLONAZEPAM SERPL-MCNC: 13.9 NG/ML

## 2023-08-04 NOTE — TELEPHONE ENCOUNTER
----- Message from Danelle eDjesus sent at 2/28/2018 10:14 AM CST -----  Contact: Pt wife Corinne  Pt needs to come in before the 12th pt advise after scheduling visit     Says he only have 1 week to get a clearance from Dr Copeland for work    Corinne is requesting a callback at [phone]    Thanks   08/04/23 8:57 AM     [employer] SmartForm used to document caregap status.     Molly Martinez MA

## 2023-10-21 DIAGNOSIS — G40.219 COMPLEX PARTIAL EPILEPSY WITH GENERALIZATION AND WITH INTRACTABLE EPILEPSY: ICD-10-CM

## 2023-10-22 DIAGNOSIS — G40.109 PARTIAL SYMPTOMATIC EPILEPSY WITH SIMPLE PARTIAL SEIZURES, NOT INTRACTABLE, WITHOUT STATUS EPILEPTICUS: ICD-10-CM

## 2023-10-22 DIAGNOSIS — G40.219 COMPLEX PARTIAL EPILEPSY WITH GENERALIZATION AND WITH INTRACTABLE EPILEPSY: ICD-10-CM

## 2023-10-22 RX ORDER — LEVETIRACETAM 500 MG/1
500 TABLET ORAL 2 TIMES DAILY
Qty: 180 TABLET | Refills: 3 | Status: CANCELLED | OUTPATIENT
Start: 2023-10-22 | End: 2024-10-22

## 2023-10-22 RX ORDER — LAMOTRIGINE 150 MG/1
300 TABLET ORAL 2 TIMES DAILY
Qty: 360 TABLET | Refills: 3 | Status: CANCELLED | OUTPATIENT
Start: 2023-10-22 | End: 2024-10-22

## 2023-10-22 RX ORDER — CLONAZEPAM 1 MG/1
TABLET ORAL
Qty: 65 TABLET | Refills: 3 | Status: CANCELLED | OUTPATIENT
Start: 2023-10-22

## 2023-10-22 RX ORDER — LEVETIRACETAM 1000 MG/1
2000 TABLET ORAL 2 TIMES DAILY
Qty: 360 TABLET | Refills: 3 | Status: CANCELLED | OUTPATIENT
Start: 2023-10-22 | End: 2024-10-22

## 2023-10-23 RX ORDER — CLONAZEPAM 1 MG/1
TABLET ORAL
Qty: 65 TABLET | Refills: 3 | Status: SHIPPED | OUTPATIENT
Start: 2023-10-23 | End: 2024-03-03

## 2023-11-12 DIAGNOSIS — G40.219 COMPLEX PARTIAL EPILEPSY WITH GENERALIZATION AND WITH INTRACTABLE EPILEPSY: ICD-10-CM

## 2023-11-12 DIAGNOSIS — G40.109 PARTIAL SYMPTOMATIC EPILEPSY WITH SIMPLE PARTIAL SEIZURES, NOT INTRACTABLE, WITHOUT STATUS EPILEPTICUS: ICD-10-CM

## 2023-11-14 RX ORDER — LEVETIRACETAM 1000 MG/1
2000 TABLET ORAL 2 TIMES DAILY
Qty: 360 TABLET | Refills: 3 | Status: SHIPPED | OUTPATIENT
Start: 2023-11-14 | End: 2024-11-08

## 2024-03-02 DIAGNOSIS — G40.219 COMPLEX PARTIAL EPILEPSY WITH GENERALIZATION AND WITH INTRACTABLE EPILEPSY: ICD-10-CM

## 2024-03-03 RX ORDER — CLONAZEPAM 1 MG/1
TABLET ORAL
Qty: 65 TABLET | Refills: 3 | Status: SHIPPED | OUTPATIENT
Start: 2024-03-03

## 2024-05-26 DIAGNOSIS — G40.219 COMPLEX PARTIAL EPILEPSY WITH GENERALIZATION AND WITH INTRACTABLE EPILEPSY: ICD-10-CM

## 2024-05-26 DIAGNOSIS — G40.109 PARTIAL SYMPTOMATIC EPILEPSY WITH SIMPLE PARTIAL SEIZURES, NOT INTRACTABLE, WITHOUT STATUS EPILEPTICUS: ICD-10-CM

## 2024-05-27 RX ORDER — LAMOTRIGINE 150 MG/1
300 TABLET ORAL 2 TIMES DAILY
Qty: 360 TABLET | Refills: 3 | Status: SHIPPED | OUTPATIENT
Start: 2024-05-27 | End: 2025-05-22

## 2024-06-09 DIAGNOSIS — G40.219 COMPLEX PARTIAL EPILEPSY WITH GENERALIZATION AND WITH INTRACTABLE EPILEPSY: Primary | ICD-10-CM

## 2024-06-09 RX ORDER — LEVETIRACETAM 500 MG/1
500 TABLET ORAL 2 TIMES DAILY
Qty: 180 TABLET | Refills: 3 | Status: SHIPPED | OUTPATIENT
Start: 2024-06-09 | End: 2025-06-04

## 2024-07-13 DIAGNOSIS — G40.219 COMPLEX PARTIAL EPILEPSY WITH GENERALIZATION AND WITH INTRACTABLE EPILEPSY: ICD-10-CM

## 2024-07-22 RX ORDER — CLONAZEPAM 1 MG/1
TABLET ORAL
Qty: 65 TABLET | Refills: 3 | Status: SHIPPED | OUTPATIENT
Start: 2024-07-22

## 2024-10-17 DIAGNOSIS — G40.219 COMPLEX PARTIAL EPILEPSY WITH GENERALIZATION AND WITH INTRACTABLE EPILEPSY: ICD-10-CM

## 2024-10-17 DIAGNOSIS — G40.109 PARTIAL SYMPTOMATIC EPILEPSY WITH SIMPLE PARTIAL SEIZURES, NOT INTRACTABLE, WITHOUT STATUS EPILEPTICUS: ICD-10-CM

## 2024-10-21 ENCOUNTER — TELEPHONE (OUTPATIENT)
Dept: NEUROLOGY | Facility: CLINIC | Age: 39
End: 2024-10-21
Payer: MEDICAID

## 2024-10-21 RX ORDER — LEVETIRACETAM 1000 MG/1
TABLET ORAL
Qty: 360 TABLET | Refills: 3 | Status: SHIPPED | OUTPATIENT
Start: 2024-10-21

## 2024-10-21 NOTE — TELEPHONE ENCOUNTER
Called patient and patient's spouse to schedule virtual follow up visit with Paras, unable to reach patient or leave voicemail. Left voicemail for patient's spouse to reach out to schedule follow up appointment with Angella.

## 2024-11-12 DIAGNOSIS — G40.219 COMPLEX PARTIAL EPILEPSY WITH GENERALIZATION AND WITH INTRACTABLE EPILEPSY: ICD-10-CM

## 2024-11-12 RX ORDER — CLONAZEPAM 1 MG/1
TABLET ORAL
Qty: 65 TABLET | Refills: 3 | Status: SHIPPED | OUTPATIENT
Start: 2024-11-12

## 2025-01-10 ENCOUNTER — OFFICE VISIT (OUTPATIENT)
Dept: NEUROLOGY | Facility: CLINIC | Age: 40
End: 2025-01-10

## 2025-01-10 VITALS
WEIGHT: 233.69 LBS | SYSTOLIC BLOOD PRESSURE: 119 MMHG | DIASTOLIC BLOOD PRESSURE: 82 MMHG | HEART RATE: 92 BPM | BODY MASS INDEX: 32.72 KG/M2 | HEIGHT: 71 IN

## 2025-01-10 DIAGNOSIS — G40.219 COMPLEX PARTIAL EPILEPSY WITH GENERALIZATION AND WITH INTRACTABLE EPILEPSY: Primary | ICD-10-CM

## 2025-01-10 PROCEDURE — 99214 OFFICE O/P EST MOD 30 MIN: CPT | Mod: PBBFAC | Performed by: PSYCHIATRY & NEUROLOGY

## 2025-01-10 PROCEDURE — 99999 PR PBB SHADOW E&M-EST. PATIENT-LVL IV: CPT | Mod: PBBFAC,,, | Performed by: PSYCHIATRY & NEUROLOGY

## 2025-01-10 RX ORDER — CLOBAZAM 20 MG/1
20 TABLET ORAL NIGHTLY
Qty: 90 TABLET | Refills: 1 | Status: SHIPPED | OUTPATIENT
Start: 2025-01-10 | End: 2025-07-09
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
## 2025-01-10 NOTE — PATIENT INSTRUCTIONS
START CLOBAZAM (ONFI) 1 TAB TAB AT BED TIME    YOU WILL BE CONTACTED TO SCHEDULE THREE NIGHT EEG    CAN CONSIDER VAGUS NERVE STIMULATOR OR NEUROPACE IF EPISODES CONTINUE

## 2025-01-13 ENCOUNTER — TELEPHONE (OUTPATIENT)
Dept: NEUROLOGY | Facility: CLINIC | Age: 40
End: 2025-01-13

## 2025-01-13 NOTE — TELEPHONE ENCOUNTER
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 He confirms he does not currently have coverage and will be on his wife's plan as of March 2025. I let him know that we cannot order this testing without insurance; to please let us know once he has coverage. V/U

## 2025-03-29 DIAGNOSIS — G40.219 COMPLEX PARTIAL EPILEPSY WITH GENERALIZATION AND WITH INTRACTABLE EPILEPSY: ICD-10-CM

## 2025-04-01 RX ORDER — CLONAZEPAM 1 MG/1
TABLET ORAL
Qty: 65 TABLET | Refills: 3 | Status: SHIPPED | OUTPATIENT
Start: 2025-04-01

## 2025-05-12 DIAGNOSIS — G40.109 PARTIAL SYMPTOMATIC EPILEPSY WITH SIMPLE PARTIAL SEIZURES, NOT INTRACTABLE, WITHOUT STATUS EPILEPTICUS: ICD-10-CM

## 2025-05-12 DIAGNOSIS — G40.219 COMPLEX PARTIAL EPILEPSY WITH GENERALIZATION AND WITH INTRACTABLE EPILEPSY: ICD-10-CM

## 2025-05-12 RX ORDER — LEVETIRACETAM 500 MG/1
500 TABLET ORAL 2 TIMES DAILY
Qty: 180 TABLET | Refills: 3 | Status: SHIPPED | OUTPATIENT
Start: 2025-05-12 | End: 2026-05-07

## 2025-05-12 RX ORDER — LAMOTRIGINE 150 MG/1
TABLET ORAL
Qty: 360 TABLET | Refills: 3 | Status: SHIPPED | OUTPATIENT
Start: 2025-05-12

## 2025-06-28 DIAGNOSIS — G40.219 COMPLEX PARTIAL EPILEPSY WITH GENERALIZATION AND WITH INTRACTABLE EPILEPSY: ICD-10-CM

## 2025-06-30 DIAGNOSIS — G40.219 COMPLEX PARTIAL EPILEPSY WITH GENERALIZATION AND WITH INTRACTABLE EPILEPSY: ICD-10-CM

## 2025-06-30 RX ORDER — CLOBAZAM 20 MG/1
20 TABLET ORAL NIGHTLY
Qty: 90 TABLET | Refills: 1 | Status: SHIPPED | OUTPATIENT
Start: 2025-06-30 | End: 2025-12-27

## 2025-06-30 RX ORDER — CLOBAZAM 20 MG/1
20 TABLET ORAL NIGHTLY
Qty: 90 TABLET | Refills: 1 | OUTPATIENT
Start: 2025-06-30

## 2025-07-29 DIAGNOSIS — G40.219 COMPLEX PARTIAL EPILEPSY WITH GENERALIZATION AND WITH INTRACTABLE EPILEPSY: ICD-10-CM

## 2025-07-30 DIAGNOSIS — G40.219 COMPLEX PARTIAL EPILEPSY WITH GENERALIZATION AND WITH INTRACTABLE EPILEPSY: ICD-10-CM

## 2025-07-30 RX ORDER — CLONAZEPAM 1 MG/1
TABLET ORAL
Qty: 65 TABLET | Refills: 3 | Status: SHIPPED | OUTPATIENT
Start: 2025-07-30

## 2025-07-30 RX ORDER — CLOBAZAM 20 MG/1
20 TABLET ORAL NIGHTLY
Qty: 90 TABLET | Refills: 1 | Status: SHIPPED | OUTPATIENT
Start: 2025-07-30 | End: 2026-01-26

## 2025-08-29 ENCOUNTER — TELEPHONE (OUTPATIENT)
Dept: NEUROLOGY | Facility: CLINIC | Age: 40
End: 2025-08-29